# Patient Record
Sex: FEMALE | NOT HISPANIC OR LATINO | Employment: FULL TIME | URBAN - METROPOLITAN AREA
[De-identification: names, ages, dates, MRNs, and addresses within clinical notes are randomized per-mention and may not be internally consistent; named-entity substitution may affect disease eponyms.]

---

## 2023-12-06 ENCOUNTER — OFFICE VISIT (OUTPATIENT)
Dept: PRIMARY CARE | Facility: CLINIC | Age: 53
End: 2023-12-06
Payer: COMMERCIAL

## 2023-12-06 ENCOUNTER — LAB (OUTPATIENT)
Dept: LAB | Facility: LAB | Age: 53
End: 2023-12-06
Payer: COMMERCIAL

## 2023-12-06 VITALS
OXYGEN SATURATION: 96 % | WEIGHT: 138.4 LBS | DIASTOLIC BLOOD PRESSURE: 96 MMHG | HEART RATE: 82 BPM | SYSTOLIC BLOOD PRESSURE: 132 MMHG | BODY MASS INDEX: 24.52 KG/M2

## 2023-12-06 DIAGNOSIS — Z00.00 ENCOUNTER FOR PREVENTATIVE ADULT HEALTH CARE EXAMINATION: ICD-10-CM

## 2023-12-06 DIAGNOSIS — Z01.84 IMMUNITY STATUS TESTING: ICD-10-CM

## 2023-12-06 DIAGNOSIS — F32.0 CURRENT MILD EPISODE OF MAJOR DEPRESSIVE DISORDER WITHOUT PRIOR EPISODE (CMS-HCC): ICD-10-CM

## 2023-12-06 DIAGNOSIS — R03.0 ELEVATED BLOOD PRESSURE READING: ICD-10-CM

## 2023-12-06 DIAGNOSIS — Z23 NEED FOR DIPHTHERIA-TETANUS-PERTUSSIS (TDAP) VACCINE: ICD-10-CM

## 2023-12-06 DIAGNOSIS — Z12.31 SCREENING MAMMOGRAM FOR BREAST CANCER: Primary | ICD-10-CM

## 2023-12-06 LAB
25(OH)D3 SERPL-MCNC: 27 NG/ML (ref 30–100)
ALBUMIN SERPL BCP-MCNC: 4.5 G/DL (ref 3.4–5)
ALP SERPL-CCNC: 79 U/L (ref 33–110)
ALT SERPL W P-5'-P-CCNC: 16 U/L (ref 7–45)
ANION GAP SERPL CALC-SCNC: 14 MMOL/L (ref 10–20)
APPEARANCE UR: CLEAR
AST SERPL W P-5'-P-CCNC: 21 U/L (ref 9–39)
BASOPHILS # BLD AUTO: 0.06 X10*3/UL (ref 0–0.1)
BASOPHILS NFR BLD AUTO: 1 %
BILIRUB SERPL-MCNC: 1.3 MG/DL (ref 0–1.2)
BILIRUB UR STRIP.AUTO-MCNC: NEGATIVE MG/DL
BUN SERPL-MCNC: 20 MG/DL (ref 6–23)
CALCIUM SERPL-MCNC: 9.5 MG/DL (ref 8.6–10.6)
CHLORIDE SERPL-SCNC: 104 MMOL/L (ref 98–107)
CHOLEST SERPL-MCNC: 177 MG/DL (ref 0–199)
CHOLESTEROL/HDL RATIO: 2.1
CO2 SERPL-SCNC: 27 MMOL/L (ref 21–32)
COLOR UR: YELLOW
CREAT SERPL-MCNC: 0.67 MG/DL (ref 0.5–1.05)
EOSINOPHIL # BLD AUTO: 0.07 X10*3/UL (ref 0–0.7)
EOSINOPHIL NFR BLD AUTO: 1.2 %
ERYTHROCYTE [DISTWIDTH] IN BLOOD BY AUTOMATED COUNT: 12.2 % (ref 11.5–14.5)
EST. AVERAGE GLUCOSE BLD GHB EST-MCNC: 105 MG/DL
GFR SERPL CREATININE-BSD FRML MDRD: >90 ML/MIN/1.73M*2
GLUCOSE SERPL-MCNC: 87 MG/DL (ref 74–99)
GLUCOSE UR STRIP.AUTO-MCNC: NEGATIVE MG/DL
HBA1C MFR BLD: 5.3 %
HBV SURFACE AB SER-ACNC: <3.1 MIU/ML
HBV SURFACE AG SERPL QL IA: NONREACTIVE
HCT VFR BLD AUTO: 44.5 % (ref 36–46)
HDLC SERPL-MCNC: 86.1 MG/DL
HGB BLD-MCNC: 14.8 G/DL (ref 12–16)
IMM GRANULOCYTES # BLD AUTO: 0.02 X10*3/UL (ref 0–0.7)
IMM GRANULOCYTES NFR BLD AUTO: 0.3 % (ref 0–0.9)
KETONES UR STRIP.AUTO-MCNC: NEGATIVE MG/DL
LDLC SERPL CALC-MCNC: 75 MG/DL
LEUKOCYTE ESTERASE UR QL STRIP.AUTO: NEGATIVE
LYMPHOCYTES # BLD AUTO: 1.62 X10*3/UL (ref 1.2–4.8)
LYMPHOCYTES NFR BLD AUTO: 28 %
MCH RBC QN AUTO: 30.5 PG (ref 26–34)
MCHC RBC AUTO-ENTMCNC: 33.3 G/DL (ref 32–36)
MCV RBC AUTO: 92 FL (ref 80–100)
MONOCYTES # BLD AUTO: 0.38 X10*3/UL (ref 0.1–1)
MONOCYTES NFR BLD AUTO: 6.6 %
NEUTROPHILS # BLD AUTO: 3.64 X10*3/UL (ref 1.2–7.7)
NEUTROPHILS NFR BLD AUTO: 62.9 %
NITRITE UR QL STRIP.AUTO: NEGATIVE
NON HDL CHOLESTEROL: 91 MG/DL (ref 0–149)
NRBC BLD-RTO: 0 /100 WBCS (ref 0–0)
PH UR STRIP.AUTO: 6.5 [PH]
PLATELET # BLD AUTO: 281 X10*3/UL (ref 150–450)
POTASSIUM SERPL-SCNC: 4.2 MMOL/L (ref 3.5–5.3)
PROT SERPL-MCNC: 7.3 G/DL (ref 6.4–8.2)
PROT UR STRIP.AUTO-MCNC: NEGATIVE MG/DL
RBC # BLD AUTO: 4.86 X10*6/UL (ref 4–5.2)
RBC # UR STRIP.AUTO: NEGATIVE /UL
SODIUM SERPL-SCNC: 141 MMOL/L (ref 136–145)
SP GR UR STRIP.AUTO: >1.03
TRIGL SERPL-MCNC: 81 MG/DL (ref 0–149)
TSH SERPL-ACNC: 0.97 MIU/L (ref 0.44–3.98)
UROBILINOGEN UR STRIP.AUTO-MCNC: 0.2 MG/DL
VLDL: 16 MG/DL (ref 0–40)
WBC # BLD AUTO: 5.8 X10*3/UL (ref 4.4–11.3)

## 2023-12-06 PROCEDURE — 36415 COLL VENOUS BLD VENIPUNCTURE: CPT

## 2023-12-06 PROCEDURE — 99396 PREV VISIT EST AGE 40-64: CPT | Performed by: INTERNAL MEDICINE

## 2023-12-06 PROCEDURE — 93000 ELECTROCARDIOGRAM COMPLETE: CPT | Performed by: INTERNAL MEDICINE

## 2023-12-06 PROCEDURE — 86706 HEP B SURFACE ANTIBODY: CPT

## 2023-12-06 PROCEDURE — 83036 HEMOGLOBIN GLYCOSYLATED A1C: CPT

## 2023-12-06 PROCEDURE — 1036F TOBACCO NON-USER: CPT | Performed by: INTERNAL MEDICINE

## 2023-12-06 PROCEDURE — 82306 VITAMIN D 25 HYDROXY: CPT

## 2023-12-06 PROCEDURE — 80061 LIPID PANEL: CPT

## 2023-12-06 PROCEDURE — 87340 HEPATITIS B SURFACE AG IA: CPT

## 2023-12-06 PROCEDURE — 85025 COMPLETE CBC W/AUTO DIFF WBC: CPT

## 2023-12-06 PROCEDURE — 90471 IMMUNIZATION ADMIN: CPT | Performed by: INTERNAL MEDICINE

## 2023-12-06 PROCEDURE — 80053 COMPREHEN METABOLIC PANEL: CPT

## 2023-12-06 PROCEDURE — 81003 URINALYSIS AUTO W/O SCOPE: CPT | Performed by: INTERNAL MEDICINE

## 2023-12-06 PROCEDURE — 84443 ASSAY THYROID STIM HORMONE: CPT

## 2023-12-06 PROCEDURE — 90715 TDAP VACCINE 7 YRS/> IM: CPT | Performed by: INTERNAL MEDICINE

## 2023-12-06 RX ORDER — FLUOXETINE 10 MG/1
10 CAPSULE ORAL DAILY
Qty: 30 CAPSULE | Refills: 1 | Status: SHIPPED | OUTPATIENT
Start: 2023-12-06 | End: 2024-02-08 | Stop reason: SDUPTHER

## 2023-12-06 ASSESSMENT — PATIENT HEALTH QUESTIONNAIRE - PHQ9
4. FEELING TIRED OR HAVING LITTLE ENERGY: NOT AT ALL
1. LITTLE INTEREST OR PLEASURE IN DOING THINGS: SEVERAL DAYS
6. FEELING BAD ABOUT YOURSELF - OR THAT YOU ARE A FAILURE OR HAVE LET YOURSELF OR YOUR FAMILY DOWN: MORE THAN HALF THE DAYS
SUM OF ALL RESPONSES TO PHQ QUESTIONS 1-9: 6
10. IF YOU CHECKED OFF ANY PROBLEMS, HOW DIFFICULT HAVE THESE PROBLEMS MADE IT FOR YOU TO DO YOUR WORK, TAKE CARE OF THINGS AT HOME, OR GET ALONG WITH OTHER PEOPLE: SOMEWHAT DIFFICULT
5. POOR APPETITE OR OVEREATING: NOT AT ALL
SUM OF ALL RESPONSES TO PHQ9 QUESTIONS 1 & 2: 3
2. FEELING DOWN, DEPRESSED OR HOPELESS: MORE THAN HALF THE DAYS
3. TROUBLE FALLING OR STAYING ASLEEP: SEVERAL DAYS
7. TROUBLE CONCENTRATING ON THINGS, SUCH AS READING THE NEWSPAPER OR WATCHING TELEVISION: NOT AT ALL
9. THOUGHTS THAT YOU WOULD BE BETTER OFF DEAD, OR OF HURTING YOURSELF: NOT AT ALL
8. MOVING OR SPEAKING SO SLOWLY THAT OTHER PEOPLE COULD HAVE NOTICED. OR THE OPPOSITE, BEING SO FIGETY OR RESTLESS THAT YOU HAVE BEEN MOVING AROUND A LOT MORE THAN USUAL: NOT AT ALL

## 2023-12-06 ASSESSMENT — ANXIETY QUESTIONNAIRES
7. FEELING AFRAID AS IF SOMETHING AWFUL MIGHT HAPPEN: NOT AT ALL
4. TROUBLE RELAXING: SEVERAL DAYS
5. BEING SO RESTLESS THAT IT IS HARD TO SIT STILL: NOT AT ALL
IF YOU CHECKED OFF ANY PROBLEMS ON THIS QUESTIONNAIRE, HOW DIFFICULT HAVE THESE PROBLEMS MADE IT FOR YOU TO DO YOUR WORK, TAKE CARE OF THINGS AT HOME, OR GET ALONG WITH OTHER PEOPLE: SOMEWHAT DIFFICULT
1. FEELING NERVOUS, ANXIOUS, OR ON EDGE: SEVERAL DAYS
3. WORRYING TOO MUCH ABOUT DIFFERENT THINGS: NOT AT ALL
GAD7 TOTAL SCORE: 4
2. NOT BEING ABLE TO STOP OR CONTROL WORRYING: SEVERAL DAYS
6. BECOMING EASILY ANNOYED OR IRRITABLE: SEVERAL DAYS

## 2023-12-06 NOTE — PROGRESS NOTES
Subjective     Patient ID: Annmarie Hutchins is a 53 y.o. female who presents for No chief complaint on file..  HPI  53-year-old female patient of Dr. Murrell here for establishment of care and preventive care visit last seen 3/22.    Past medical history  -Menopause - h/o endometrial ablation in the past, LMP 10/20. Longstanding night sweats post birth largely unchanged, had anxiety treated with effexor but discontinued did not want to be treated with medications. Tried weighted blanket 0. Has been seeing a therapist   -LBP and SI joint pain -recommended conservative measures.     Social  - - for industrial automation   - Lives at home with Youngest 13, 15, 17, 21, 23 - oldest one out of the house.   -No tobacco alcohol or drug use    Lifestyle   - Diet - overall has healthy diet, organic, not always adherent.   - Exercise - lifts twice a week and yoga twice a week, some cardio component  - Sleep - wakes up in the middle of the night related to  stress, is able to go back to sleep, does do meditation.     Review of Systems:   General: No constitutional symptoms, some weight gain  HEENT: No headaches, changes in vision or hearing, no sinus or dental issues   Cardiac: No chest pain, palpitations, dyspnea on exertion   Lungs: No cough, wheezing, shortness of breath   Abdomen: No abdominal pain, nausea/vomiting, diarrhea or constipation   : No urinary complaints   Musculoskeletal: Back pain  Heme: No bleeding or thrombosis issues   Lymph: No swollen lymph glands   Skin: No rashes or lesions   Psych: As described    Objective       Current Outpatient Medications:     FLUoxetine (PROzac) 10 mg capsule, Take 1 capsule (10 mg) by mouth once daily., Disp: 30 capsule, Rfl: 1      BP (!) 132/96   Pulse 82   Wt 62.8 kg (138 lb 6.4 oz)   SpO2 96%   BMI 24.52 kg/m²       Physical Examination:   General: Awake, alert, appears stated age   Head/eyes/ears: NCAT, EOMI, PERRL, TM WNL, no cerumen  Throat: moist mucus  membranes, no pharyngeal erythema  Neck: Supple, nontender, no lymphadenopathy, thyroid exam unremarkable   Breast exam: No palpable lumps, no discharge, no noted axillary lymphadenopathy. Chaperone offered and declined  Heart: RRR, no murmurs, rubs or gallops  Lungs: CTA bilaterally, no rhonchi rales or wheezes   Abdomen: Soft, NT/ND  Extremities: No edema, 2+ DP pulses   Skin: No concerning skin lesions on visualized skin   Neuro: AAO x 3, no FND     Assessment/Plan   Problem List Items Addressed This Visit      Adult Health Examination  Age appropriate screening performed   Healthy lifestyle reviewed.   No additional pertinent family history or toxic habits   No high risk sexual behavior, declines STI screening  Cancer screening:   - Colonoscopy 3/22 repeat 10 years  - Mammogram 1/22 due for repeat  - Pap smear plus HPV 1/22  - Skin cancer prevention strategies reviewed, UTD with FSE   Immunizations: Flu shot declined, tetanus today, COVID booster at pharmacy, consider Shingrix  Dental and visual examinations up-to-date     Current mild episode of major depressive disorder without prior episode (CMS/HCC)     Discussed various ways of managing.  Currently engaging with therapist with some worsening of her symptoms.  Discussed consideration for management.   Previously treated with effexor, unclear benefit.   Will initiate trial of Prozac, counseling provided potential side effects and management strategies.  Will follow-up in 2 weeks if no response to symptoms at which point will start an SSRI.  If no symptom improvement may consider HRT.          Other Visit Diagnoses       Screening mammogram for breast cancer    -  Primary    Relevant Orders    BI mammo bilateral screening tomosynthesis    Need for diphtheria-tetanus-pertussis (Tdap) vaccine        Relevant Orders    Tdap vaccine, age 7 years and older    Immunity status testing        Relevant Orders    Hepatitis B Surface Antigen    Hepatitis B Surface  Antibody    Encounter for preventative adult health care examination        Relevant Orders    CBC and Auto Differential    Comprehensive Metabolic Panel    Hemoglobin A1C    Lipid Panel    TSH with reflex to Free T4 if abnormal    Vitamin D 25-Hydroxy,Total (for eval of Vitamin D levels)    Elevated blood pressure reading        Elevated in the past, attributed to anxiety and stress.  Advised home blood pressure monitoring further restratification and follow-up at next visit.    Relevant Orders    ECG 12 lead (Clinic Performed)        Low back pain -to discuss in greater detail at next visit, consider PT with DNS vs OMM     Follow-up 2 months

## 2023-12-06 NOTE — ASSESSMENT & PLAN NOTE
Discussed various ways of managing.  Currently engaging with therapist with some worsening of her symptoms.  Discussed consideration for management.   Previously treated with effexor, unclear benefit.   Will initiate trial of Prozac, counseling provided potential side effects and management strategies.  Will follow-up in 2 weeks if no response to symptoms at which point will start an SSRI.  If no symptom improvement may consider HRT.

## 2023-12-06 NOTE — PATIENT INSTRUCTIONS
VEGA Anguiano booster at pharmacy   Back pain - consider OMT vs physical therapist or physical therapist who performs dynamic neuromuscular stabilization.  Blood pressure   Go to www.validatebp.org for list of validated blood pressure cuffs.   I recommend you monitor your home blood pressure readings. To do this, be sure that the blood pressure cuff is on bare skin. Feet should be planted on the floor and back should be supported. Arm should be resting at the level of the heart. No talking to anyone during measurement and no caffeine within 30 minutes of checking blood pressure. Goal should be <130/80 on AVERAGE (outliers do not count).   I have ordered blood and/or urine tests for you to do today. The lab can be found on this floor (2nd floor) next to the pharmacy across from the elevators.    Follow up in 2 months.

## 2024-01-18 ENCOUNTER — APPOINTMENT (OUTPATIENT)
Dept: PRIMARY CARE | Facility: CLINIC | Age: 54
End: 2024-01-18
Payer: COMMERCIAL

## 2024-01-18 NOTE — PROGRESS NOTES
Subjective   Patient ID: Annmarie Hutchins is a 53 y.o. female who presents for No chief complaint on file..  HPI    53F here for followup visit, last seen in December for establishment of care and for preventive care visit.   - At the time was concerned about worsening depressive symptoms despite behavioral interventions, previously on effexor. Started prozac at last visit. Possible relation to perimenopause to consider HRT.  -Elevated blood pressure readings noted at last visit  -LBP-to discuss today.    12/23: possible LAE on EKG   Hep b not immune, vitamin d insufficient. Otherwise labs good.     Past medical history  -Menopause - h/o endometrial ablation in the past, LMP 10/20. Longstanding night sweats post birth largely unchanged, had anxiety treated with effexor but discontinued did not want to be treated with medications. Tried weighted blanket 0. Has been seeing a therapist   -LBP and SI joint pain -recommended conservative measures.      Social  - - for industrial automation   - Lives at home with Youngest 13, 15, 17, 21, 23 - oldest one out of the house.   -No tobacco alcohol or drug use       Current Outpatient Medications   Medication Instructions    FLUoxetine (PROZAC) 10 mg, oral, Daily        Objective     There were no vitals taken for this visit.    Physical Exam    Assessment/Plan   Problem List Items Addressed This Visit    None    Health maintenance  Cancer screening:  -Colonoscopy 3/22 repeat 10 years   -Mammogram last visit  -Pap smear plus HPV 1/22   -Skin- utd with FSE   Immunizations:

## 2024-02-08 ENCOUNTER — OFFICE VISIT (OUTPATIENT)
Dept: PRIMARY CARE | Facility: CLINIC | Age: 54
End: 2024-02-08
Payer: COMMERCIAL

## 2024-02-08 ENCOUNTER — DOCUMENTATION (OUTPATIENT)
Dept: PRIMARY CARE | Facility: CLINIC | Age: 54
End: 2024-02-08

## 2024-02-08 VITALS
WEIGHT: 138 LBS | HEART RATE: 86 BPM | DIASTOLIC BLOOD PRESSURE: 83 MMHG | OXYGEN SATURATION: 95 % | HEIGHT: 63 IN | TEMPERATURE: 97.4 F | BODY MASS INDEX: 24.45 KG/M2 | SYSTOLIC BLOOD PRESSURE: 128 MMHG

## 2024-02-08 DIAGNOSIS — M54.50 CHRONIC BILATERAL LOW BACK PAIN WITHOUT SCIATICA: Primary | ICD-10-CM

## 2024-02-08 DIAGNOSIS — R03.0 ELEVATED BLOOD PRESSURE READING: ICD-10-CM

## 2024-02-08 DIAGNOSIS — G89.29 CHRONIC BILATERAL LOW BACK PAIN WITHOUT SCIATICA: Primary | ICD-10-CM

## 2024-02-08 DIAGNOSIS — F32.0 CURRENT MILD EPISODE OF MAJOR DEPRESSIVE DISORDER WITHOUT PRIOR EPISODE (CMS-HCC): ICD-10-CM

## 2024-02-08 DIAGNOSIS — R68.82 LOW LIBIDO: ICD-10-CM

## 2024-02-08 PROCEDURE — 1036F TOBACCO NON-USER: CPT | Performed by: INTERNAL MEDICINE

## 2024-02-08 PROCEDURE — 99213 OFFICE O/P EST LOW 20 MIN: CPT | Performed by: INTERNAL MEDICINE

## 2024-02-08 RX ORDER — FLUOXETINE 10 MG/1
10 CAPSULE ORAL DAILY
Qty: 90 CAPSULE | Refills: 3 | Status: SHIPPED | OUTPATIENT
Start: 2024-02-08 | End: 2025-02-02

## 2024-02-08 ASSESSMENT — PATIENT HEALTH QUESTIONNAIRE - PHQ9
10. IF YOU CHECKED OFF ANY PROBLEMS, HOW DIFFICULT HAVE THESE PROBLEMS MADE IT FOR YOU TO DO YOUR WORK, TAKE CARE OF THINGS AT HOME, OR GET ALONG WITH OTHER PEOPLE: NOT DIFFICULT AT ALL
3. TROUBLE FALLING OR STAYING ASLEEP: SEVERAL DAYS
6. FEELING BAD ABOUT YOURSELF - OR THAT YOU ARE A FAILURE OR HAVE LET YOURSELF OR YOUR FAMILY DOWN: NOT AT ALL
4. FEELING TIRED OR HAVING LITTLE ENERGY: NOT AT ALL
8. MOVING OR SPEAKING SO SLOWLY THAT OTHER PEOPLE COULD HAVE NOTICED. OR THE OPPOSITE, BEING SO FIGETY OR RESTLESS THAT YOU HAVE BEEN MOVING AROUND A LOT MORE THAN USUAL: NOT AT ALL
SUM OF ALL RESPONSES TO PHQ QUESTIONS 1-9: 2
5. POOR APPETITE OR OVEREATING: NOT AT ALL
2. FEELING DOWN, DEPRESSED OR HOPELESS: SEVERAL DAYS
9. THOUGHTS THAT YOU WOULD BE BETTER OFF DEAD, OR OF HURTING YOURSELF: NOT AT ALL
SUM OF ALL RESPONSES TO PHQ9 QUESTIONS 1 & 2: 1
7. TROUBLE CONCENTRATING ON THINGS, SUCH AS READING THE NEWSPAPER OR WATCHING TELEVISION: NOT AT ALL
1. LITTLE INTEREST OR PLEASURE IN DOING THINGS: NOT AT ALL

## 2024-02-08 ASSESSMENT — PAIN SCALES - GENERAL: PAINLEVEL: 0-NO PAIN

## 2024-02-08 NOTE — ASSESSMENT & PLAN NOTE
PHQ from 6 to 2!! Goal treatment for 6-12 months after which we will hope to discontinue   Continue following with therapist

## 2024-02-08 NOTE — PROGRESS NOTES
"Subjective   Patient ID: Annmarie Hutchins is a 53 y.o. female who presents for Follow-up.  HPI  53-year-old female here for follow-up visit, last seen in December for establishment of care and preventive care visit.  12/23: Labs good, mild vitamin D insufficiency, hep B nonimmune    - Depression-previously treated with Effexor, initiate trial of Prozac at last visit.   - At last visit, blood pressure readings were noted to be elevated recommended home blood pressure monitoring  - 2 years of low back pain and SI joint, some in cervical spine, seen by chiropractor in the past, no sciatica, no weakness, numbness/tingling. Symptoms most in the early in the day and also at the end.         Past medical history  -Menopause - h/o endometrial ablation in the past, LMP 10/20. Longstanding night sweats post pregancnies largely unchanged, had anxiety treated with effexor but discontinued did not want to be treated with medications. Tried weighted blanket 0. Has been seeing a therapist   -LBP and SI joint pain -recommended conservative measures.      Social  - - for industrial automation   - Lives at home with Youngest 13, 15, 17, 21, 23 - oldest one out of the house.   -No tobacco alcohol or drug use  Current Outpatient Medications   Medication Instructions    FLUoxetine (PROZAC) 10 mg, oral, Daily        Objective     /83   Pulse 86   Temp 36.3 °C (97.4 °F) (Temporal)   Ht 1.6 m (5' 3\")   Wt 62.6 kg (138 lb)   SpO2 95%   BMI 24.45 kg/m²     Physical Exam  General: Alert and oriented, in no apparent distress   HEENT: No conjunctival erythema, no external facial lesions   Lungs: Breathing comfortably  Skin: No evidence of skin breakdown.  Neuro: AAO x 3, answering questions appropriately, no obvious cranial nerve deficits   Musk: no paraspinal tenderness, no midline tenderness, strength 5/5 bilateral lower extremities, no sensory deficits. SLR test negative bilaterally, no midline spinous tenderness to " palpation.   Assessment/Plan   Problem List Items Addressed This Visit       Current mild episode of major depressive disorder without prior episode (CMS/HCC)     PHQ from 6 to 2!! Goal treatment for 6-12 months after which we will hope to discontinue   Continue following with therapist          Relevant Medications    FLUoxetine (PROzac) 10 mg capsule    Chronic bilateral low back pain without sciatica - Primary  Reviewed supportive measures, pain control, refer to physical therapy     Relevant Orders    Referral to Physical Therapy     Other Visit Diagnoses       Low libido      Longstanding, do not suspect related to perimenopause, will refer to sexual medicine.    Relevant Orders    Referral to Sexual Medicine    Elevated blood pressure reading      Mild, recheck at next visit. Continue healthy lifestyle.     Relevant Orders    Follow Up In [facility] - PCP - Established            Health maintenance  Cancer screening:  -Colonoscopy 3/22 repeat 10 years  -Mammogram ordered not yet obtained  -Pap smear plus HPV 1/22  -Follows with Derm for FSE  Immunizations: Hepatitis B nonimmune declines vaccination

## 2024-02-08 NOTE — PATIENT INSTRUCTIONS
Annmarie,   Blood pressure- continue to work on healthy lifestyle, we will recheck blood pressure in 6 months   Referral to sexual medicine - I recommend Dr. Pamela Humphrey   Back pain - referral to physical therapy   See you in 6 months

## 2024-02-08 NOTE — PROGRESS NOTES
Pt has an established PCP already .   She asked about billing today and I informed her that she will be a new pt for this practice . If her concerns are urgent/ emergent , she can discuss today . If non urgent , she can fu with her PCP .   Pt chose to cancel her appt .

## 2024-02-15 ENCOUNTER — APPOINTMENT (OUTPATIENT)
Dept: UROLOGY | Facility: CLINIC | Age: 54
End: 2024-02-15
Payer: COMMERCIAL

## 2024-05-10 ENCOUNTER — HOSPITAL ENCOUNTER (OUTPATIENT)
Dept: RADIOLOGY | Facility: CLINIC | Age: 54
Discharge: HOME | End: 2024-05-10
Payer: COMMERCIAL

## 2024-05-10 VITALS — BODY MASS INDEX: 24.45 KG/M2 | WEIGHT: 138.01 LBS | HEIGHT: 63 IN

## 2024-05-10 PROCEDURE — 77067 SCR MAMMO BI INCL CAD: CPT

## 2024-05-10 PROCEDURE — 77063 BREAST TOMOSYNTHESIS BI: CPT | Performed by: STUDENT IN AN ORGANIZED HEALTH CARE EDUCATION/TRAINING PROGRAM

## 2024-05-10 PROCEDURE — 77067 SCR MAMMO BI INCL CAD: CPT | Performed by: STUDENT IN AN ORGANIZED HEALTH CARE EDUCATION/TRAINING PROGRAM

## 2024-05-20 DIAGNOSIS — R92.8 ABNORMAL MAMMOGRAM OF RIGHT BREAST: Primary | ICD-10-CM

## 2024-05-31 ENCOUNTER — HOSPITAL ENCOUNTER (OUTPATIENT)
Dept: RADIOLOGY | Facility: CLINIC | Age: 54
Discharge: HOME | End: 2024-05-31
Payer: COMMERCIAL

## 2024-05-31 VITALS — WEIGHT: 138.01 LBS | HEIGHT: 63 IN | BODY MASS INDEX: 24.45 KG/M2

## 2024-05-31 DIAGNOSIS — R92.8 ABNORMAL MAMMOGRAM OF RIGHT BREAST: ICD-10-CM

## 2024-05-31 PROCEDURE — 77061 BREAST TOMOSYNTHESIS UNI: CPT | Mod: RT

## 2024-05-31 PROCEDURE — 77061 BREAST TOMOSYNTHESIS UNI: CPT | Mod: RIGHT SIDE | Performed by: STUDENT IN AN ORGANIZED HEALTH CARE EDUCATION/TRAINING PROGRAM

## 2024-05-31 PROCEDURE — 77065 DX MAMMO INCL CAD UNI: CPT | Mod: RIGHT SIDE | Performed by: STUDENT IN AN ORGANIZED HEALTH CARE EDUCATION/TRAINING PROGRAM

## 2024-08-01 ENCOUNTER — APPOINTMENT (OUTPATIENT)
Dept: UROLOGY | Facility: CLINIC | Age: 54
End: 2024-08-01
Payer: COMMERCIAL

## 2024-09-12 ENCOUNTER — APPOINTMENT (OUTPATIENT)
Dept: UROLOGY | Facility: CLINIC | Age: 54
End: 2024-09-12
Payer: COMMERCIAL

## 2024-09-12 VITALS
DIASTOLIC BLOOD PRESSURE: 88 MMHG | HEART RATE: 69 BPM | WEIGHT: 145 LBS | SYSTOLIC BLOOD PRESSURE: 132 MMHG | HEIGHT: 63 IN | BODY MASS INDEX: 25.69 KG/M2 | TEMPERATURE: 97.3 F

## 2024-09-12 DIAGNOSIS — N95.1 MENOPAUSAL SYMPTOMS: ICD-10-CM

## 2024-09-12 PROCEDURE — 3008F BODY MASS INDEX DOCD: CPT | Performed by: OBSTETRICS & GYNECOLOGY

## 2024-09-12 PROCEDURE — 99204 OFFICE O/P NEW MOD 45 MIN: CPT | Performed by: OBSTETRICS & GYNECOLOGY

## 2024-09-12 ASSESSMENT — PAIN SCALES - GENERAL: PAINLEVEL: 0-NO PAIN

## 2024-09-12 NOTE — PROGRESS NOTES
INITIAL EVALUATION       HISTORY OF PRESENT ILLNESS:   Annmarie Hutchins is a 54 y.o. female who presents to me today for evaluation of menopause. Her last menses was in 2020. She reports experiencing nocturia x 1, low desire, and sweatiness. She reports that approximately 1 year before being diagnosed with depression she went through an angry, anxious phase where she was extremely irritable. She currently takes Prozac 10 mg for depression which works well for her.    She has a history of several miscarriages, 1 ectopic pregnancy, and 1 still birth.     She was diagnosed with osteopenia on 1/14/22.     PAST MEDICAL HISTORY:  Past Medical History:   Diagnosis Date    Asymptomatic menopausal state 03/29/2022    History of menopause    Personal history of other diseases of the musculoskeletal system and connective tissue 01/14/2022    History of osteopenia    Reaction to severe stress, unspecified 01/24/2022    Stress       PAST SURGICAL HISTORY:  Past Surgical History:   Procedure Laterality Date    ENDOMETRIAL ABLATION      SALPINGECTOMY          ALLERGIES:   No Known Allergies     MEDICATIONS:   Medication Documentation Review Audit       Reviewed by Lesa Finn MA (Medical Assistant) on 09/12/24 at 1314      Medication Order Taking? Sig Documenting Provider Last Dose Status   FLUoxetine (PROzac) 10 mg capsule [id number] Yes Take 1 capsule (10 mg) by mouth once daily. Eva Edmondson, DO Taking Active                     SOCIAL HISTORY:  Patient  reports that she has never smoked. She has never used smokeless tobacco. She reports current alcohol use of about 1.0 standard drink of alcohol per week. She reports that she does not use drugs.   Social History     Socioeconomic History    Marital status:      Spouse name: Not on file    Number of children: Not on file    Years of education: Not on file    Highest education level: Not on file   Occupational History    Not on file   Tobacco Use    Smoking status:  "Never    Smokeless tobacco: Never   Substance and Sexual Activity    Alcohol use: Yes     Alcohol/week: 1.0 standard drink of alcohol     Types: 1 Standard drinks or equivalent per week    Drug use: Never    Sexual activity: Not on file   Other Topics Concern    Not on file   Social History Narrative    Not on file     Social Determinants of Health     Financial Resource Strain: Not on File (10/8/2020)    [facility] from [facility]    Financial Resource Strain     Financial Resource Strain: 0   Food Insecurity: Not on File (10/8/2020)    [facility] from [facility]    Food Insecurity     Food: 0   Transportation Needs: Not on File (10/8/2020)    [facility] from [facility]    Transportation Needs     Transportation: 0   Physical Activity: Not on File (10/8/2020)    [facility] from [facility]    Physical Activity     Physical Activity: 0   Stress: Not on File (10/8/2020)    [facility] from [facility]    Stress     Stress: 0   Social Connections: Not on File (10/8/2020)    Received from [facility]    Social Connections     Social Connections and Isolation: 0   Intimate Partner Violence: Not on file   Housing Stability: Not on File (10/8/2020)    [facility] from [facility]    Housing Stability     Housin       FAMILY HISTORY:  No family history on file.    REVIEW OF SYSTEMS:  Constitutional: Negative for fever and chills. Denies anorexia, weight loss.  Eyes: Negative for visual disturbance.   Respiratory: Negative for shortness of breath.    Cardiovascular: Negative for chest pain.   Gastrointestinal: Negative for nausea and vomiting.   Genitourinary: See interval history above.  Skin: Negative for rash.   Neurological: Negative for dizziness and numbness.   Psychiatric/Behavioral: Negative for confusion and decreased concentration.     PHYSICAL EXAM:  Blood pressure 132/88, pulse 69, temperature 36.3 °C (97.3 °F), height 1.6 m (5' 3\"), weight 65.8 kg (145 lb).  Constitutional: Patient appears well-developed and " well-nourished. No distress.    Head: Normocephalic and atraumatic.    Neck: Normal range of motion.    Cardiovascular: Normal rate.    Pulmonary/Chest: Effort normal. No respiratory distress.   Musculoskeletal: Normal range of motion.    Neurological: Alert and oriented to person, place, and time.  Psychiatric: Normal mood and affect. Behavior is normal. Thought content normal.       Assessment:        Annmarie Hutchins is a 54 y.o. female with menopausal symptoms.    Current research considers the initiation of menopausal hormone therapy (MHT) to be a safe option for healthy, symptomatic women who are within 10 years of menopause or younger than age 60 years and who do not have contraindications to MHT (such as a history of breast cancer, coronary heart disease, a previous venous thromboembolic event or stroke, or active liver disease).     Benefits include improvement in mood, sleep, and hot flashes. We will start at the lowest dose of a weekly estrogen patch and oral micronized progesterone tablet to protect the lining of the uterus from the estrogen.       We will re-evaluate in 3 months to see if the dose is the right amount of estrogen. The risk of breast cancer is individualized, but we will try to avoid continuing estrogen beyond 5 years for this reason unless menopausal symptoms continue to disrupt your quality of life. At that time, we can make a shared and informed decision about risks/benefits.      Plan:   Prescription for estradiol 0.025 mg patch sent to the patient's pharmacy.    Follow-up in 3 months.       Pamela Humphrey MD MPH      Scribe Attestation  By signing my name below, I Adriana Patel, Scribe   attest that this documentation has been prepared under the direction and in the presence of Pamela Humphrey MD MPH.

## 2024-09-16 RX ORDER — ESTRADIOL 0.05 MG/D
1 FILM, EXTENDED RELEASE TRANSDERMAL 2 TIMES WEEKLY
Qty: 24 PATCH | Refills: 1 | Status: SHIPPED | OUTPATIENT
Start: 2024-09-16 | End: 2025-03-03

## 2024-09-16 RX ORDER — PROGESTERONE 100 MG/1
100 CAPSULE ORAL NIGHTLY
Qty: 60 CAPSULE | Refills: 3 | Status: SHIPPED | OUTPATIENT
Start: 2024-09-16 | End: 2025-09-16

## 2024-10-18 ENCOUNTER — APPOINTMENT (OUTPATIENT)
Dept: BEHAVIORAL HEALTH | Facility: CLINIC | Age: 54
End: 2024-10-18
Payer: COMMERCIAL

## 2024-10-18 DIAGNOSIS — F32.0 CURRENT MILD EPISODE OF MAJOR DEPRESSIVE DISORDER, UNSPECIFIED WHETHER RECURRENT (CMS-HCC): Primary | ICD-10-CM

## 2024-10-18 PROCEDURE — 90791 PSYCH DIAGNOSTIC EVALUATION: CPT | Performed by: PSYCHOLOGIST

## 2024-10-18 NOTE — LETTER
October 18, 2024     Pamela Humphrey MD MPH  [address]  [place] [address]    Patient: Annmarie Hutchins   YOB: 1970   Date of Visit: 10/18/2024       Dear Dr. Pamela Humphrey MD MPH:    Thank you for referring Annmarie Hutchins to me for evaluation. Below are my notes for this consultation.  If you have questions, please do not hesitate to call me. I look forward to following your patient along with you.       Sincerely,     Jennifer Colindres, PhD      CC: No Recipients  ______________________________________________________________________________________    Initial Assessment  Virtual visit with simultaneous audio and visual equipment.  POS 10  No falls, tobacco use, or SI    Annmarie 54, is referred for CBT and to address HSDD by Dr. Humphrey.  Relevant History  Annmarie is  to [place] x 30 years. She had a stillbirth and ectopic pregnancy before having her 5 living children ages 23, 21, 18, 16, 14.  The 16 and 14 year old both have sig psychological issues (depression) and the 18 year old --now at college, has an eating disorder. 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  She is very happy with Napoleon but reports HSDD x many years.  This is one chief complaint for her.  She has had sig menopausal symptoms including VMS, insomnia and Dr. Humphrey started her on estradiol patch and Prometrium and 5 weeks in she feels sig improvement.  Blood pressure has been higher than typical for 2 years and is being watched by PCP.  She meets criteria for mild to moderate depression and has been on prozac since January.  She was in therapy for 2 years and found it helpful but stopped due to insurance issues. She would like to work with me for now to focus on her HSDD.  Plan-CBT to help reduce a variety of postmenopausal symptoms and focus on HSDD.  Initially provided some education re desire and  responsive desire and, particularly since she enjoys meditation and yoga, asked her (and Napoleon) to listen to audiobook of Better sex thru mindfulness. Will then see her back to continue assessment and treatment of HSDD and will consider other pharmacologic options if symptoms persist once stable on HT.

## 2024-10-18 NOTE — PROGRESS NOTES
Initial Assessment  Virtual visit with simultaneous audio and visual equipment.  POS 10  No falls, tobacco use, or SI    Annmarie 54, is referred for CBT and to address HSDD by Dr. Humphrey.  Relevant History  Annmarie is  to [place] x 30 years. She had a stillbirth and ectopic pregnancy before having her 5 living children ages 23, 21, 18, 16, 14.  The 16 and 14 year old both have sig psychological issues (depression) and the 18 year old --now at college, has an eating disorder. 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  She is very happy with Napoleon but reports HSDD x many years.  This is one chief complaint for her.  She has had sig menopausal symptoms including VMS, insomnia and Dr. Humphrey started her on estradiol patch and Prometrium and 5 weeks in she feels sig improvement.  Blood pressure has been higher than typical for 2 years and is being watched by PCP.  She meets criteria for mild to moderate depression and has been on prozac since January.  She was in therapy for 2 years and found it helpful but stopped due to insurance issues. She would like to work with me for now to focus on her HSDD.  Plan-CBT to help reduce a variety of postmenopausal symptoms and focus on HSDD.  Initially provided some education re desire and responsive desire and, particularly since she enjoys meditation and yoga, asked her (and Napoleon) to listen to audiobook of Better sex thru mindfulness. Will then see her back to continue assessment and treatment of HSDD and will consider other pharmacologic options if symptoms persist once stable on HT.

## 2024-10-31 ENCOUNTER — TELEMEDICINE (OUTPATIENT)
Dept: BEHAVIORAL HEALTH | Facility: CLINIC | Age: 54
End: 2024-10-31
Payer: COMMERCIAL

## 2024-10-31 DIAGNOSIS — F32.0 CURRENT MILD EPISODE OF MAJOR DEPRESSIVE DISORDER WITHOUT PRIOR EPISODE (CMS-HCC): Primary | ICD-10-CM

## 2024-10-31 PROCEDURE — 90837 PSYTX W PT 60 MINUTES: CPT | Performed by: PSYCHOLOGIST

## 2024-11-27 NOTE — PROGRESS NOTES
FOLLOW-UP VISIT       HISTORY OF PRESENT ILLNESS:   Annmarie Hutchins is a 54 y.o. female who presents to me today for follow-up of menopausal symptoms. At her last visit, she was started on estradiol 0.05 mg patches and micronized progesterone 100 mg nightly as part of MHT which the patient states has worked well for her. She reports that her sweatiness initially went away however recently started to experience this again. She is interested in trying a higher dose of estrogen.     The patient sees Dr. Colindres for HSDD which she states is working well for her. She is possibly interested in testosterone therapy for further management of HSDD.     The patient has concerns for weight gain which started earlier this year. She currently participates in yoga for physical activity.     PAST MEDICAL HISTORY:  Past Medical History:   Diagnosis Date    Asymptomatic menopausal state 03/29/2022    History of menopause    Personal history of other diseases of the musculoskeletal system and connective tissue 01/14/2022    History of osteopenia    Reaction to severe stress, unspecified 01/24/2022    Stress       PAST SURGICAL HISTORY:  Past Surgical History:   Procedure Laterality Date    ENDOMETRIAL ABLATION      SALPINGECTOMY          ALLERGIES:   No Known Allergies     MEDICATIONS:   Medication Documentation Review Audit       Reviewed by Lesa Finn MA (Medical Assistant) on 12/05/24 at 0921      Medication Order Taking? Sig Documenting Provider Last Dose Status   estradiol (Vivelle-Dot) 0.05 mg/24 hr patch [id number] Yes Place 1 patch over 96 hours on the skin 2 times a week. Pamela Humphrey MD MPH  Active   FLUoxetine (PROzac) 10 mg capsule [id number] Yes Take 1 capsule (10 mg) by mouth once daily. Eva Emdondson,  Taking Active   progesterone (Prometrium) 100 mg capsule [id number] Yes Take 1 capsule (100 mg) by mouth once daily at bedtime. Pamela Humphrey MD MPH  Active                     SOCIAL HISTORY:  Patient  reports that  she has never smoked. She has never used smokeless tobacco. She reports current alcohol use of about 1.0 standard drink of alcohol per week. She reports that she does not use drugs.   Social History     Socioeconomic History    Marital status:      Spouse name: Not on file    Number of children: Not on file    Years of education: Not on file    Highest education level: Not on file   Occupational History    Not on file   Tobacco Use    Smoking status: Never    Smokeless tobacco: Never   Substance and Sexual Activity    Alcohol use: Yes     Alcohol/week: 1.0 standard drink of alcohol     Types: 1 Standard drinks or equivalent per week    Drug use: Never    Sexual activity: Not on file   Other Topics Concern    Not on file   Social History Narrative    Not on file     Social Drivers of Health     Financial Resource Strain: Not on File (10/8/2020)    [facility] from [facility]    Financial Resource Strain     Financial Resource Strain: 0   Food Insecurity: Not on File (10/8/2020)    Received from [facility]    Food Insecurity     Food: 0   Transportation Needs: Not on File (10/8/2020)    Received from [facility]    Transportation Needs     Transportation: 0   Physical Activity: Not on File (10/8/2020)    [facility] from [facility]    Physical Activity     Physical Activity: 0   Stress: Not on File (10/8/2020)    Received from [facility]    Stress     Stress: 0   Social Connections: Not on File (10/8/2020)    Received from [facility]    Social Connections     Social Connections and Isolation: 0   Intimate Partner Violence: Not on file   Housing Stability: Not on File (10/8/2020)    Received from [facility]    Housing Stability     Housin       FAMILY HISTORY:  No family history on file.    REVIEW OF SYSTEMS:  Constitutional: Negative for fever and chills. Denies anorexia, weight loss.  Eyes: Negative for visual disturbance.   Respiratory: Negative for shortness of breath.    Cardiovascular: Negative  "for chest pain.   Gastrointestinal: Negative for nausea and vomiting.   Genitourinary: See interval history above.  Skin: Negative for rash.   Neurological: Negative for dizziness and numbness.   Psychiatric/Behavioral: Negative for confusion and decreased concentration.     PHYSICAL EXAM:  Blood pressure 126/81, pulse 72, temperature 36.4 °C (97.6 °F), height 1.6 m (5' 3\"), weight 66.7 kg (147 lb).  Constitutional: Patient appears well-developed and well-nourished. No distress.    Head: Normocephalic and atraumatic.    Neck: Normal range of motion.    Cardiovascular: Normal rate.    Pulmonary/Chest: Effort normal. No respiratory distress.   Musculoskeletal: Normal range of motion.    Neurological: Alert and oriented to person, place, and time.  Psychiatric: Normal mood and affect. Behavior is normal. Thought content normal.       Assessment:      1. Menopausal symptoms            Annmarie Hutchins is a 54 y.o. female with menopausal symptoms and HSDD.     We discussed r/b/a's to testosterone for HSDD and she would like to try it.      Plan:   Prescription for estradiol 0.075 mg patch sent to the patient's pharmacy.    Prescription for progesterone 100 mg capsules sent to the patient's pharmacy.   Prescription for testosterone gel sent to the patient's pharmacy.   Order baseline testosterone labs as well as testosterone levels after 6-8 weeks starting therapy.   Follow-up in 3 months.   Follow-up with Dr. Colindres in 1-2 months.       Pamela Humphrey MD MPH      Scribe Attestation  By signing my name below, IAdriana, Scribe   attest that this documentation has been prepared under the direction and in the presence of Pmaela Humphrey MD MPH.    "

## 2024-12-05 ENCOUNTER — LAB (OUTPATIENT)
Dept: LAB | Facility: LAB | Age: 54
End: 2024-12-05
Payer: COMMERCIAL

## 2024-12-05 ENCOUNTER — APPOINTMENT (OUTPATIENT)
Dept: UROLOGY | Facility: CLINIC | Age: 54
End: 2024-12-05
Payer: COMMERCIAL

## 2024-12-05 VITALS
BODY MASS INDEX: 26.05 KG/M2 | HEART RATE: 72 BPM | DIASTOLIC BLOOD PRESSURE: 81 MMHG | WEIGHT: 147 LBS | TEMPERATURE: 97.6 F | SYSTOLIC BLOOD PRESSURE: 126 MMHG | HEIGHT: 63 IN

## 2024-12-05 DIAGNOSIS — N95.1 MENOPAUSAL SYMPTOMS: ICD-10-CM

## 2024-12-05 DIAGNOSIS — F52.0 HYPOACTIVE SEXUAL DESIRE DISORDER: ICD-10-CM

## 2024-12-05 LAB — TESTOST SERPL-MCNC: <30 NG/DL (ref 0–70)

## 2024-12-05 PROCEDURE — 99213 OFFICE O/P EST LOW 20 MIN: CPT | Performed by: OBSTETRICS & GYNECOLOGY

## 2024-12-05 PROCEDURE — 1036F TOBACCO NON-USER: CPT | Performed by: OBSTETRICS & GYNECOLOGY

## 2024-12-05 PROCEDURE — 3008F BODY MASS INDEX DOCD: CPT | Performed by: OBSTETRICS & GYNECOLOGY

## 2024-12-05 PROCEDURE — 84403 ASSAY OF TOTAL TESTOSTERONE: CPT

## 2024-12-05 RX ORDER — ESTRADIOL 0.07 MG/D
1 FILM, EXTENDED RELEASE TRANSDERMAL 2 TIMES WEEKLY
Qty: 26 PATCH | Refills: 3 | Status: SHIPPED | OUTPATIENT
Start: 2024-12-05 | End: 2024-12-05 | Stop reason: SDUPTHER

## 2024-12-05 RX ORDER — TESTOSTERONE 20.25 MG/1.25G
GEL TOPICAL
Qty: 75 G | Refills: 3 | Status: SHIPPED | OUTPATIENT
Start: 2024-12-05 | End: 2024-12-05 | Stop reason: SDUPTHER

## 2024-12-05 ASSESSMENT — PAIN SCALES - GENERAL: PAINLEVEL_OUTOF10: 0-NO PAIN

## 2024-12-06 DIAGNOSIS — F52.0 HYPOACTIVE SEXUAL DESIRE DISORDER: ICD-10-CM

## 2024-12-06 DIAGNOSIS — N95.1 MENOPAUSAL SYMPTOMS: ICD-10-CM

## 2024-12-06 RX ORDER — TESTOSTERONE 20.25 MG/1.25G
GEL TOPICAL
Qty: 75 G | Refills: 3 | Status: SHIPPED | OUTPATIENT
Start: 2024-12-06

## 2024-12-06 RX ORDER — PROGESTERONE 100 MG/1
100 CAPSULE ORAL NIGHTLY
Qty: 60 CAPSULE | Refills: 3 | Status: SHIPPED | OUTPATIENT
Start: 2024-12-06 | End: 2024-12-10

## 2024-12-06 RX ORDER — TESTOSTERONE 20.25 MG/1.25G
GEL TOPICAL
Qty: 75 G | Refills: 3 | Status: SHIPPED | OUTPATIENT
Start: 2024-12-06 | End: 2024-12-06 | Stop reason: SDUPTHER

## 2024-12-06 RX ORDER — ESTRADIOL 0.07 MG/D
1 FILM, EXTENDED RELEASE TRANSDERMAL 2 TIMES WEEKLY
Qty: 26 PATCH | Refills: 3 | Status: SHIPPED | OUTPATIENT
Start: 2024-12-09 | End: 2024-12-10

## 2024-12-10 RX ORDER — PROGESTERONE 100 MG/1
100 CAPSULE ORAL NIGHTLY
Qty: 90 CAPSULE | Refills: 1 | Status: SHIPPED | OUTPATIENT
Start: 2024-12-10

## 2024-12-10 RX ORDER — ESTRADIOL 0.07 MG/D
1 FILM, EXTENDED RELEASE TRANSDERMAL 2 TIMES WEEKLY
Qty: 26 PATCH | Refills: 1 | Status: SHIPPED | OUTPATIENT
Start: 2024-12-12

## 2025-01-28 DIAGNOSIS — N95.1 MENOPAUSAL SYMPTOMS: ICD-10-CM

## 2025-01-28 RX ORDER — ESTRADIOL 0.1 MG/D
1 FILM, EXTENDED RELEASE TRANSDERMAL 2 TIMES WEEKLY
Qty: 24 PATCH | Refills: 1 | Status: SHIPPED | OUTPATIENT
Start: 2025-01-30 | End: 2026-01-30

## 2025-02-19 ENCOUNTER — APPOINTMENT (OUTPATIENT)
Dept: PRIMARY CARE | Facility: CLINIC | Age: 55
End: 2025-02-19
Payer: COMMERCIAL

## 2025-02-20 ENCOUNTER — APPOINTMENT (OUTPATIENT)
Dept: PRIMARY CARE | Facility: CLINIC | Age: 55
End: 2025-02-20
Payer: COMMERCIAL

## 2025-02-20 VITALS
HEART RATE: 62 BPM | WEIGHT: 146 LBS | DIASTOLIC BLOOD PRESSURE: 71 MMHG | OXYGEN SATURATION: 98 % | TEMPERATURE: 98 F | BODY MASS INDEX: 25.86 KG/M2 | SYSTOLIC BLOOD PRESSURE: 124 MMHG

## 2025-02-20 DIAGNOSIS — F32.0 CURRENT MILD EPISODE OF MAJOR DEPRESSIVE DISORDER WITHOUT PRIOR EPISODE (CMS-HCC): ICD-10-CM

## 2025-02-20 DIAGNOSIS — N95.1 MENOPAUSAL SYMPTOMS: ICD-10-CM

## 2025-02-20 DIAGNOSIS — Z00.00 ENCOUNTER FOR PREVENTATIVE ADULT HEALTH CARE EXAMINATION: ICD-10-CM

## 2025-02-20 DIAGNOSIS — Z12.31 SCREENING MAMMOGRAM FOR BREAST CANCER: Primary | ICD-10-CM

## 2025-02-20 PROCEDURE — 1036F TOBACCO NON-USER: CPT | Performed by: INTERNAL MEDICINE

## 2025-02-20 PROCEDURE — 99396 PREV VISIT EST AGE 40-64: CPT | Performed by: INTERNAL MEDICINE

## 2025-02-20 RX ORDER — ESTRADIOL 0.1 MG/D
1 FILM, EXTENDED RELEASE TRANSDERMAL 2 TIMES WEEKLY
Qty: 24 PATCH | Refills: 1 | Status: SHIPPED | OUTPATIENT
Start: 2025-02-20

## 2025-02-20 ASSESSMENT — PATIENT HEALTH QUESTIONNAIRE - PHQ9
SUM OF ALL RESPONSES TO PHQ9 QUESTIONS 1 & 2: 0
1. LITTLE INTEREST OR PLEASURE IN DOING THINGS: NOT AT ALL
2. FEELING DOWN, DEPRESSED OR HOPELESS: NOT AT ALL

## 2025-02-20 ASSESSMENT — PAIN SCALES - GENERAL: PAINLEVEL_OUTOF10: 0-NO PAIN

## 2025-02-20 NOTE — PROGRESS NOTES
Subjective     Patient ID: Annmarie Hutchins is a 54 y.o. female who presents for Annual Exam.  HPI    54-year-old female here for preventive care visit, last seen last year.      5/24 right breast asymmetry obtain diagnostic imaging diagnostic imaging good repeat annual screening mammo    Past medical history  - Depression - given prozac with improvement, stopped it 1.5 weeks ago previously on effexor   - Perimenopause - h/o endometrial ablation in the past, LMP 10/20. Night sweats improved with HRT + progesterone has been working well.   - low libido - on testosterone followed by Dr. Humphrey has been helping   - LBP and SI joint pain -recommended conservative measures.  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   - Father moving to [place] she will be his caretaker   -No tobacco alcohol or drug use    Lifestyle - weight gain from 138 to 146  - Diet - same diet   - Exercise - more yoga, not lifting weights   - Sleep - fine, 7-8 hours no interruptiosn.     Objective       Current Outpatient Medications:     progesterone (Prometrium) 100 mg capsule, Take 1 capsule (100 mg) by mouth once daily at bedtime., Disp: 90 capsule, Rfl: 1    testosterone 20.25 mg/1.25 gram (1.62 %) gel in metered-dose pump, Apply one pump once weekly, Disp: 75 g, Rfl: 3    estradiol (Vivelle-DOT) 0.1 mg/24 hr patch, Place 1 patch on the skin 2 times a week., Disp: 24 patch, Rfl: 1      /71   Pulse 62   Temp 36.7 °C (98 °F) (Temporal)   Wt 66.2 kg (146 lb)   SpO2 98%   BMI 25.86 kg/m²       Physical Examination:   General: Awake, alert, appears stated age   Head/eyes/ears: NCAT, EOMI, PERRL, TM WNL, no cerumen  Throat: moist mucus membranes, no pharyngeal  erythema  Neck: Supple, nontender, no lymphadenopathy, thyroid exam unremarkable   Breast exam: No palpable lumps, no discharge, no noted axillary lymphadenopathy. Chaperone offered and declined   Heart: RRR, no murmurs, rubs or gallops  Lungs: CTA bilaterally, no rhonchi rales or wheezes   Abdomen: Soft, NT/ND  Extremities: No edema, 2+ DP pulses   Skin: No concerning skin lesions on visualized skin   Neuro: AAO x 3, no FND, gait unremarkable    Assessment/Plan         Assessment & Plan  Encounter for preventative adult health care examination  Adult Health Examination  Age appropriate screening performed   Healthy lifestyle reviewed.   No additional pertinent family history or toxic habits   No high risk sexual behavior, declines STI screen   Cancer screening:   - Colonoscopy 3/22 repeat 10 years  - Mammogram due in May  - Pap smear plus HPV 1/22  - Skin cancer prevention strategies reviewed has had FSE with derm   Immunizations   - Due: Hepatitis B, shingrix declined   - UTD: flu, COVID, Tdap,   Dental and visual examinations   Discussed adequate Vitamin D intake       Orders:    CBC and Auto Differential; Future    Comprehensive Metabolic Panel; Future    Lipid Panel; Future    Hemoglobin A1C; Future    TSH with reflex to Free T4 if abnormal; Future    Lipoprotein A (LPA); Future    Screening mammogram for breast cancer    Orders:    BI mammo bilateral screening tomosynthesis    Current mild episode of major depressive disorder without prior episode (CMS-HCC)  Off prozac, symptoms stable   Advised self monitoring, will notify me if change.          Menopausal symptoms  Followed by Dr. Humphrey, improved with HRT          BMI 25.0-25.9,adult  Concerned about progressive weight gain   Extensively discussed interest in longevity including indications for hs-CRP, lifestyle adjustment with CGM, consideration for body composition and/or V02max.   Recommend further discussion at next visit   Check Lp(a)        Followup 3  months

## 2025-02-20 NOTE — PATIENT INSTRUCTIONS
Annmarie,   Depression - keep an eye on symptoms. Can do your own PHQ-9 screening questionnaire and let me know if any changes.   Dermatology for full skin exam   Diet adjustment - try [employer] continuous glucose monitor   Mammogram in May  - Call [phone] to have this scheduled.   Consider shingrix vaccine   Consider coronary artery calcium score.     Followup 3 months

## 2025-02-21 LAB
ALBUMIN SERPL-MCNC: 4.6 G/DL (ref 3.6–5.1)
ALP SERPL-CCNC: 71 U/L (ref 37–153)
ALT SERPL-CCNC: 14 U/L (ref 6–29)
ANION GAP SERPL CALCULATED.4IONS-SCNC: 8 MMOL/L (CALC) (ref 7–17)
AST SERPL-CCNC: 20 U/L (ref 10–35)
BASOPHILS # BLD AUTO: 38 CELLS/UL (ref 0–200)
BASOPHILS NFR BLD AUTO: 0.6 %
BILIRUB SERPL-MCNC: 1.3 MG/DL (ref 0.2–1.2)
BUN SERPL-MCNC: 16 MG/DL (ref 7–25)
CALCIUM SERPL-MCNC: 9.2 MG/DL (ref 8.6–10.4)
CHLORIDE SERPL-SCNC: 104 MMOL/L (ref 98–110)
CHOLEST SERPL-MCNC: 180 MG/DL
CHOLEST/HDLC SERPL: 2.5 (CALC)
CO2 SERPL-SCNC: 27 MMOL/L (ref 20–32)
CREAT SERPL-MCNC: 0.77 MG/DL (ref 0.5–1.03)
EGFRCR SERPLBLD CKD-EPI 2021: 92 ML/MIN/1.73M2
EOSINOPHIL # BLD AUTO: 38 CELLS/UL (ref 15–500)
EOSINOPHIL NFR BLD AUTO: 0.6 %
ERYTHROCYTE [DISTWIDTH] IN BLOOD BY AUTOMATED COUNT: 12.4 % (ref 11–15)
EST. AVERAGE GLUCOSE BLD GHB EST-MCNC: 108 MG/DL
EST. AVERAGE GLUCOSE BLD GHB EST-SCNC: 6 MMOL/L
GLUCOSE SERPL-MCNC: 84 MG/DL (ref 65–99)
HBA1C MFR BLD: 5.4 % OF TOTAL HGB
HCT VFR BLD AUTO: 43.3 % (ref 35–45)
HDLC SERPL-MCNC: 73 MG/DL
HGB BLD-MCNC: 14.3 G/DL (ref 11.7–15.5)
LDLC SERPL CALC-MCNC: 88 MG/DL (CALC)
LPA SERPL-SCNC: NORMAL NMOL/L
LYMPHOCYTES # BLD AUTO: 2106 CELLS/UL (ref 850–3900)
LYMPHOCYTES NFR BLD AUTO: 32.9 %
MCH RBC QN AUTO: 30.8 PG (ref 27–33)
MCHC RBC AUTO-ENTMCNC: 33 G/DL (ref 32–36)
MCV RBC AUTO: 93.1 FL (ref 80–100)
MONOCYTES # BLD AUTO: 422 CELLS/UL (ref 200–950)
MONOCYTES NFR BLD AUTO: 6.6 %
NEUTROPHILS # BLD AUTO: 3795 CELLS/UL (ref 1500–7800)
NEUTROPHILS NFR BLD AUTO: 59.3 %
NONHDLC SERPL-MCNC: 107 MG/DL (CALC)
PLATELET # BLD AUTO: 263 THOUSAND/UL (ref 140–400)
PMV BLD REES-ECKER: 10.7 FL (ref 7.5–12.5)
POTASSIUM SERPL-SCNC: 4 MMOL/L (ref 3.5–5.3)
PROT SERPL-MCNC: 6.9 G/DL (ref 6.1–8.1)
RBC # BLD AUTO: 4.65 MILLION/UL (ref 3.8–5.1)
SODIUM SERPL-SCNC: 139 MMOL/L (ref 135–146)
TRIGL SERPL-MCNC: 96 MG/DL
TSH SERPL-ACNC: 1.08 MIU/L
WBC # BLD AUTO: 6.4 THOUSAND/UL (ref 3.8–10.8)

## 2025-02-21 NOTE — ASSESSMENT & PLAN NOTE
Concerned about progressive weight gain   Extensively discussed interest in longevity including indications for hs-CRP, lifestyle adjustment with CGM, consideration for body composition and/or V02max.   Recommend further discussion at next visit   Check Lp(a)        Followup 3 months

## 2025-02-24 LAB
ALBUMIN SERPL-MCNC: 4.6 G/DL (ref 3.6–5.1)
ALP SERPL-CCNC: 71 U/L (ref 37–153)
ALT SERPL-CCNC: 14 U/L (ref 6–29)
ANION GAP SERPL CALCULATED.4IONS-SCNC: 8 MMOL/L (CALC) (ref 7–17)
AST SERPL-CCNC: 20 U/L (ref 10–35)
BASOPHILS # BLD AUTO: 38 CELLS/UL (ref 0–200)
BASOPHILS NFR BLD AUTO: 0.6 %
BILIRUB SERPL-MCNC: 1.3 MG/DL (ref 0.2–1.2)
BUN SERPL-MCNC: 16 MG/DL (ref 7–25)
CALCIUM SERPL-MCNC: 9.2 MG/DL (ref 8.6–10.4)
CHLORIDE SERPL-SCNC: 104 MMOL/L (ref 98–110)
CHOLEST SERPL-MCNC: 180 MG/DL
CHOLEST/HDLC SERPL: 2.5 (CALC)
CO2 SERPL-SCNC: 27 MMOL/L (ref 20–32)
CREAT SERPL-MCNC: 0.77 MG/DL (ref 0.5–1.03)
EGFRCR SERPLBLD CKD-EPI 2021: 92 ML/MIN/1.73M2
EOSINOPHIL # BLD AUTO: 38 CELLS/UL (ref 15–500)
EOSINOPHIL NFR BLD AUTO: 0.6 %
ERYTHROCYTE [DISTWIDTH] IN BLOOD BY AUTOMATED COUNT: 12.4 % (ref 11–15)
EST. AVERAGE GLUCOSE BLD GHB EST-MCNC: 108 MG/DL
EST. AVERAGE GLUCOSE BLD GHB EST-SCNC: 6 MMOL/L
GLUCOSE SERPL-MCNC: 84 MG/DL (ref 65–99)
HBA1C MFR BLD: 5.4 % OF TOTAL HGB
HCT VFR BLD AUTO: 43.3 % (ref 35–45)
HDLC SERPL-MCNC: 73 MG/DL
HGB BLD-MCNC: 14.3 G/DL (ref 11.7–15.5)
LDLC SERPL CALC-MCNC: 88 MG/DL (CALC)
LPA SERPL-SCNC: <10 NMOL/L
LYMPHOCYTES # BLD AUTO: 2106 CELLS/UL (ref 850–3900)
LYMPHOCYTES NFR BLD AUTO: 32.9 %
MCH RBC QN AUTO: 30.8 PG (ref 27–33)
MCHC RBC AUTO-ENTMCNC: 33 G/DL (ref 32–36)
MCV RBC AUTO: 93.1 FL (ref 80–100)
MONOCYTES # BLD AUTO: 422 CELLS/UL (ref 200–950)
MONOCYTES NFR BLD AUTO: 6.6 %
NEUTROPHILS # BLD AUTO: 3795 CELLS/UL (ref 1500–7800)
NEUTROPHILS NFR BLD AUTO: 59.3 %
NONHDLC SERPL-MCNC: 107 MG/DL (CALC)
PLATELET # BLD AUTO: 263 THOUSAND/UL (ref 140–400)
PMV BLD REES-ECKER: 10.7 FL (ref 7.5–12.5)
POTASSIUM SERPL-SCNC: 4 MMOL/L (ref 3.5–5.3)
PROT SERPL-MCNC: 6.9 G/DL (ref 6.1–8.1)
RBC # BLD AUTO: 4.65 MILLION/UL (ref 3.8–5.1)
SODIUM SERPL-SCNC: 139 MMOL/L (ref 135–146)
TRIGL SERPL-MCNC: 96 MG/DL
TSH SERPL-ACNC: 1.08 MIU/L
WBC # BLD AUTO: 6.4 THOUSAND/UL (ref 3.8–10.8)

## 2025-02-25 ENCOUNTER — OFFICE VISIT (OUTPATIENT)
Dept: DERMATOLOGY | Facility: CLINIC | Age: 55
End: 2025-02-25
Payer: COMMERCIAL

## 2025-02-25 DIAGNOSIS — D22.9 MULTIPLE BENIGN NEVI: Primary | ICD-10-CM

## 2025-02-25 DIAGNOSIS — Z12.83 SCREENING EXAM FOR SKIN CANCER: ICD-10-CM

## 2025-02-25 DIAGNOSIS — L81.4 LENTIGO: ICD-10-CM

## 2025-02-25 PROCEDURE — 99214 OFFICE O/P EST MOD 30 MIN: CPT | Performed by: DERMATOLOGY

## 2025-02-25 PROCEDURE — 1036F TOBACCO NON-USER: CPT | Performed by: DERMATOLOGY

## 2025-02-25 RX ORDER — TRETINOIN 0.25 MG/G
CREAM TOPICAL NIGHTLY
Qty: 45 G | Refills: 3 | Status: SHIPPED | OUTPATIENT
Start: 2025-02-25 | End: 2026-02-25

## 2025-02-25 ASSESSMENT — DERMATOLOGY QUALITY OF LIFE (QOL) ASSESSMENT
RATE HOW BOTHERED YOU ARE BY EFFECTS OF YOUR SKIN PROBLEMS ON YOUR ACTIVITIES (EG, GOING OUT, ACCOMPLISHING WHAT YOU WANT, WORK ACTIVITIES OR YOUR RELATIONSHIPS WITH OTHERS): 0 - NEVER BOTHERED
DATE THE QUALITY-OF-LIFE ASSESSMENT WAS COMPLETED: 67261
RATE HOW EMOTIONALLY BOTHERED YOU ARE BY YOUR SKIN PROBLEM (FOR EXAMPLE, WORRY, EMBARRASSMENT, FRUSTRATION): 0 - NEVER BOTHERED
RATE HOW BOTHERED YOU ARE BY SYMPTOMS OF YOUR SKIN PROBLEM (EG, ITCHING, STINGING BURNING, HURTING OR SKIN IRRITATION): 0 - NEVER BOTHERED
ARE THERE EXCLUSIONS OR EXCEPTIONS FOR THE QUALITY OF LIFE ASSESSMENT: NO

## 2025-02-25 ASSESSMENT — PATIENT GLOBAL ASSESSMENT (PGA): PATIENT GLOBAL ASSESSMENT: PATIENT GLOBAL ASSESSMENT:  1 - CLEAR

## 2025-02-25 ASSESSMENT — DERMATOLOGY PATIENT ASSESSMENT
DO YOU USE SUNSCREEN: OCCASIONALLY
ARE YOU AN ORGAN TRANSPLANT RECIPIENT: NO
DO YOU HAVE ANY NEW OR CHANGING LESIONS: NO
ARE YOU TRYING TO GET PREGNANT: NO
ARE YOU ON BIRTH CONTROL: NO
HAVE YOU HAD OR DO YOU HAVE A STAPH INFECTION: NO
DO YOU HAVE IRREGULAR MENSTRUAL CYCLES: NO
HAVE YOU HAD OR DO YOU HAVE VASCULAR DISEASE: NO
DO YOU USE A TANNING BED: NO

## 2025-02-25 ASSESSMENT — ITCH NUMERIC RATING SCALE: HOW SEVERE IS YOUR ITCHING?: 0

## 2025-02-25 NOTE — PROGRESS NOTES
Marciano Hutchins is a 54 y.o. female who presents for the following: Skin Check. Last derm visit 11/2022 for Full Skin Exam and lentigines. No history of skin cancer. No history of dysplastic nevi.     Review of Systems:  No other skin or systemic complaints other than what is documented elsewhere in the note.    The following portions of the chart were reviewed this encounter and updated as appropriate:         Skin Cancer History  No skin cancer on file.      Specialty Problems    None       Objective   Well appearing patient in no apparent distress; mood and affect are within normal limits.    A full examination was performed including scalp, head, eyes, ears, nose, lips, neck, chest, axillae, abdomen, back, buttocks, bilateral upper extremities, bilateral lower extremities, hands, feet, fingers, toes, fingernails, and toenails. All findings within normal limits unless otherwise noted below.    Assessment/Plan   1. Multiple benign nevi  Brown and tan macules and papules with reassuring findings on dermoscopy    -These lesions have benign, reassuring patterns on dermoscopy  -Recommend continued self observation, and to contact the office if any changes in nevi are noticed    2. Lentigo  Tan macules scattered on cheeks, more than prior visit                  -Benign appearing on exam  -Reassurance, recommend observation    - 11/2022 Rx for compounded tretinoin / hydroquinone given with some improvement but then worsening again    - emphasized importance of daily mineral sunscreen  - plan to start with tretinoin. If further treatment required can schedule a follow up to discuss further    Related Medications  tretinoin (Retin-A) 0.025 % cream  Apply topically once daily at bedtime. A pea-sized amount to the whole face, start every 2-3 nights and gradually increase to nightly    3. Screening exam for skin cancer    Full body skin exam  -No lesions concerning for malignancy on the remainder the skin exam today    - The ugly duckling sign was discussed. Monitor for any skin lesions that are different in color, shape, or size than others on body  -Sun protection was discussed. Recommend SPF 30+, hats with brims, sun protective clothing, and avoiding sun exposure between 10 AM and 2 PM whenever possible  -Recommend regular skin exams or sooner if new or changing lesions           Follow up 2-3 years Full Skin Exam   Refills x1 year

## 2025-03-05 NOTE — PROGRESS NOTES
FOLLOW-UP VISIT       HISTORY OF PRESENT ILLNESS:   Annmarie Hutchins is a 54 y.o. female who presents to me today for follow-up of menopausal symptoms and HSDD.     At her last visit, we increased the dose of her estradiol patches to 0.075 mg's and refilled her progesterone capsules. She was then switched to the 0.1 mg patch on 1/28/25 due to experiencing night sweats. Patient reports continued vulvar sweating and notes having to change her underwear every morning.       For HSDD, we started the patient on testosterone gel and ordered testosterone labs. Patient reports to be doing well with her current dose of testosterone.     PAST MEDICAL HISTORY:  Past Medical History:   Diagnosis Date    Asymptomatic menopausal state 03/29/2022    History of menopause    Personal history of other diseases of the musculoskeletal system and connective tissue 01/14/2022    History of osteopenia    Reaction to severe stress, unspecified 01/24/2022    Stress       PAST SURGICAL HISTORY:  Past Surgical History:   Procedure Laterality Date    ENDOMETRIAL ABLATION      SALPINGECTOMY          ALLERGIES:   No Known Allergies     MEDICATIONS:   Medication Documentation Review Audit       Reviewed by Gertrudis Castro MA (Medical Assistant) on 02/25/25 at 1303      Medication Order Taking? Sig Documenting Provider Last Dose Status     Discontinued 02/20/25 1605   estradiol (Vivelle-DOT) 0.1 mg/24 hr patch [id number]  Place 1 patch on the skin 2 times a week. Pamela Humphrey MD MPH  Active   Discontinued 02/20/25 1320   progesterone (Prometrium) 100 mg capsule [id number]  Take 1 capsule (100 mg) by mouth once daily at bedtime. Pamela Humphrey MD MPH  Active   testosterone 20.25 mg/1.25 gram (1.62 %) gel in metered-dose pump [id number]  Apply one pump once weekly Pamela Humphrey MD MPH  Active                     SOCIAL HISTORY:  Patient  reports that she has never smoked. She has never used smokeless tobacco. She reports current alcohol use of about 1.0 standard  drink of alcohol per week. She reports that she does not use drugs.   Social History     Socioeconomic History    Marital status:      Spouse name: Not on file    Number of children: Not on file    Years of education: Not on file    Highest education level: Not on file   Occupational History    Not on file   Tobacco Use    Smoking status: Never    Smokeless tobacco: Never   Substance and Sexual Activity    Alcohol use: Yes     Alcohol/week: 1.0 standard drink of alcohol     Types: 1 Standard drinks or equivalent per week    Drug use: Never    Sexual activity: Not on file   Other Topics Concern    Not on file   Social History Narrative    Not on file     Social Drivers of Health     Financial Resource Strain: Not on File (10/8/2020)    [facility]    Financial Resource Strain     Financial Resource Strain: 0   Food Insecurity: Not on File (10/8/2020)    [facility] from [facility]    Food Insecurity     Food: 0   Transportation Needs: Not on File (10/8/2020)    Received from [facility]    Transportation Needs     Transportation: 0   Physical Activity: Not on File (10/8/2020)    [facility] from [facility]    Physical Activity     Physical Activity: 0   Stress: Not on File (10/8/2020)    [facility] from [facility]    Stress     Stress: 0   Social Connections: Not on File (10/8/2020)    Received from [facility]    Social Connections     Social Connections and Isolation: 0   Intimate Partner Violence: Not on file   Housing Stability: Not on File (10/8/2020)    [facility] from [facility]    Housing Stability     Housin       FAMILY HISTORY:  No family history on file.    REVIEW OF SYSTEMS:  Constitutional: Negative for fever and chills. Denies anorexia, weight loss.  Eyes: Negative for visual disturbance.   Respiratory: Negative for shortness of breath.    Cardiovascular: Negative for chest pain.   Gastrointestinal: Negative for nausea and vomiting.   Genitourinary: See interval history  above.  Skin: Negative for rash.   Neurological: Negative for dizziness and numbness.   Psychiatric/Behavioral: Negative for confusion and decreased concentration.     PHYSICAL EXAM:  There were no vitals taken for this visit.  Virtual appointment, patient appears well.      LABORATORY REVIEW:   Component      Latest Ref Rng 12/5/2024   Testosterone      0 - 70 ng/dL <30       Assessment:      1. Menopausal symptoms        2. Hypoactive sexual desire disorder  Testosterone    Testosterone          Annmarie Hutchins is a 54 y.o. female with menopausal symptoms and HSDD.     We discussed having her start vaginal estrogen cream for her vulvar sweating. Prescription sent to the patient's pharmacy. We also discussed having the patient start tretinoin cream, prescription sent to the pharmacy. She will start off taking tretinoin once a week followed by every other day and eventually use daily. She will need to use a sunscreen with SPF during the day due to tretinoin making her skin more sensitive. Patient verbalized understanding and will follow-up in 3 months.      Plan:   Prescription for tretinoin 0.025% cream sent to the patient's pharmacy.    Prescription for estradiol 0.01% vaginal cream sent to the patient's pharmacy.      Order testosterone labs.  Follow-up in 3 months.     Pamela Humphrey MD MPH    Scribe Attestation  By signing my name below, IAdriana, Scrbebeto   attest that this documentation has been prepared under the direction and in the presence of Pamela Humphrey MD MPH.

## 2025-03-06 ENCOUNTER — APPOINTMENT (OUTPATIENT)
Dept: UROLOGY | Facility: CLINIC | Age: 55
End: 2025-03-06
Payer: COMMERCIAL

## 2025-03-06 DIAGNOSIS — F52.0 HYPOACTIVE SEXUAL DESIRE DISORDER: ICD-10-CM

## 2025-03-06 DIAGNOSIS — N95.1 MENOPAUSAL SYMPTOMS: ICD-10-CM

## 2025-03-06 PROCEDURE — 99213 OFFICE O/P EST LOW 20 MIN: CPT | Performed by: OBSTETRICS & GYNECOLOGY

## 2025-03-06 RX ORDER — ESTRADIOL 10 UG/1
10 TABLET, FILM COATED VAGINAL 2 TIMES WEEKLY
Qty: 24 TABLET | Refills: 1 | Status: CANCELLED | OUTPATIENT
Start: 2025-03-06

## 2025-03-11 RX ORDER — TRETINOIN 0.25 MG/G
CREAM TOPICAL
Qty: 20 G | Refills: 0 | Status: SHIPPED | OUTPATIENT
Start: 2025-03-11

## 2025-03-11 RX ORDER — ESTRADIOL 0.1 MG/G
CREAM VAGINAL
Qty: 42.5 G | Refills: 0 | Status: SHIPPED | OUTPATIENT
Start: 2025-03-11

## 2025-03-11 RX ORDER — ESTRADIOL 0.1 MG/G
CREAM VAGINAL
Qty: 42.5 G | Refills: 2 | Status: SHIPPED | OUTPATIENT
Start: 2025-03-11

## 2025-03-11 RX ORDER — TRETINOIN 0.25 MG/G
CREAM TOPICAL
Qty: 45 G | Refills: 1 | Status: SHIPPED | OUTPATIENT
Start: 2025-03-11

## 2025-03-15 DIAGNOSIS — N95.1 MENOPAUSAL SYMPTOMS: ICD-10-CM

## 2025-03-17 RX ORDER — ESTRADIOL 0.1 MG/G
CREAM VAGINAL
Qty: 42.5 G | Refills: 3 | Status: SHIPPED | OUTPATIENT
Start: 2025-03-17

## 2025-05-02 LAB — TESTOST SERPL-MCNC: 27 NG/DL (ref 2–45)

## 2025-05-15 ENCOUNTER — HOSPITAL ENCOUNTER (OUTPATIENT)
Dept: RADIOLOGY | Facility: CLINIC | Age: 55
Discharge: HOME | End: 2025-05-15
Payer: COMMERCIAL

## 2025-05-15 VITALS — HEIGHT: 63 IN | BODY MASS INDEX: 25.86 KG/M2 | WEIGHT: 145.94 LBS

## 2025-05-15 PROCEDURE — 77063 BREAST TOMOSYNTHESIS BI: CPT | Performed by: RADIOLOGY

## 2025-05-15 PROCEDURE — 77067 SCR MAMMO BI INCL CAD: CPT | Performed by: RADIOLOGY

## 2025-05-15 PROCEDURE — 77067 SCR MAMMO BI INCL CAD: CPT

## 2025-05-18 ENCOUNTER — OFFICE VISIT (OUTPATIENT)
Dept: URGENT CARE | Age: 55
End: 2025-05-18
Payer: COMMERCIAL

## 2025-05-18 ENCOUNTER — APPOINTMENT (OUTPATIENT)
Dept: RADIOLOGY | Facility: HOSPITAL | Age: 55
End: 2025-05-18
Payer: COMMERCIAL

## 2025-05-18 ENCOUNTER — HOSPITAL ENCOUNTER (EMERGENCY)
Facility: HOSPITAL | Age: 55
Discharge: HOME | End: 2025-05-18
Payer: COMMERCIAL

## 2025-05-18 VITALS
SYSTOLIC BLOOD PRESSURE: 138 MMHG | WEIGHT: 140 LBS | HEIGHT: 63 IN | HEART RATE: 70 BPM | DIASTOLIC BLOOD PRESSURE: 90 MMHG | TEMPERATURE: 97.9 F | RESPIRATION RATE: 18 BRPM | OXYGEN SATURATION: 99 % | BODY MASS INDEX: 24.8 KG/M2

## 2025-05-18 VITALS
RESPIRATION RATE: 17 BRPM | DIASTOLIC BLOOD PRESSURE: 86 MMHG | SYSTOLIC BLOOD PRESSURE: 132 MMHG | OXYGEN SATURATION: 96 % | TEMPERATURE: 98.1 F | HEART RATE: 65 BPM

## 2025-05-18 DIAGNOSIS — S61.112A LACERATION OF LEFT THUMB WITHOUT FOREIGN BODY WITH DAMAGE TO NAIL, INITIAL ENCOUNTER: Primary | ICD-10-CM

## 2025-05-18 DIAGNOSIS — S61.319A LACERATION OF NAIL BED OF FINGER, INITIAL ENCOUNTER: Primary | ICD-10-CM

## 2025-05-18 PROCEDURE — 99283 EMERGENCY DEPT VISIT LOW MDM: CPT

## 2025-05-18 PROCEDURE — 12001 RPR S/N/AX/GEN/TRNK 2.5CM/<: CPT

## 2025-05-18 PROCEDURE — 73130 X-RAY EXAM OF HAND: CPT | Mod: LT

## 2025-05-18 PROCEDURE — 73130 X-RAY EXAM OF HAND: CPT | Mod: LEFT SIDE | Performed by: RADIOLOGY

## 2025-05-18 ASSESSMENT — COLUMBIA-SUICIDE SEVERITY RATING SCALE - C-SSRS
6. HAVE YOU EVER DONE ANYTHING, STARTED TO DO ANYTHING, OR PREPARED TO DO ANYTHING TO END YOUR LIFE?: NO
2. HAVE YOU ACTUALLY HAD ANY THOUGHTS OF KILLING YOURSELF?: NO
1. IN THE PAST MONTH, HAVE YOU WISHED YOU WERE DEAD OR WISHED YOU COULD GO TO SLEEP AND NOT WAKE UP?: NO

## 2025-05-18 ASSESSMENT — PAIN SCALES - GENERAL: PAINLEVEL_OUTOF10: 0 - NO PAIN

## 2025-05-18 ASSESSMENT — PAIN - FUNCTIONAL ASSESSMENT: PAIN_FUNCTIONAL_ASSESSMENT: 0-10

## 2025-05-18 NOTE — DISCHARGE INSTRUCTIONS
Referral for orthopedics hand placed. Please follow up for further evaluation and management. You can stop at the  in waiting room to schedule an appointment before you leave today.  Please return to ED for any new or worsening symptoms

## 2025-05-18 NOTE — ED TRIAGE NOTES
Pt states that she sliced her thumb in a mandolin chopper at home. Pt presents to triage with laceration to the thumb of her left hand. Dressing placed in triage bleeding controlled.

## 2025-05-19 NOTE — ED PROVIDER NOTES
HPI   CC: Laceration     Patient is a 54-year-old female with benign PMH presenting to ED with concern for left thumb laceration.  Patient states at 1 PM she was cleaning the blade of her mandolin and took it out of a package and sliced the tip of her left thumb.  She tried running it under water and wrapped it in a paper towel.  She currently endorses pain at the tip of her thumb.  She does have full range of motion to her left thumb.  Last tetanus was on 12/2023.  She denies any immunocompromise including diabetes, cancer, chemotherapy, steroid use, or HIV.  She is right-handed.  She denies any foreign body sensation to the area.  Patient was seen in urgent care who referred her to the ED.        ROS: 10-point review of systems was performed and is otherwise negative except as noted in HPI.    Limitations to history: N/A  Independent Historians: Self   External Records Reviewed: Outpatient notes in EMR    Past Medical History: Noncontributory except per HPI     Past Surgical History: Noncontributory except per HPI     Family History: Reviewed and noncontributory     Social History:  Denies tobacco. Denies ETOH. Denies illicit drugs.    RX Allergies[1]    Home Meds:   Current Outpatient Medications   Medication Instructions    estradiol (Estrace) 0.01 % (0.1 mg/gram) vaginal cream Insert a pea-sized amount into vagina three times per week at bedtime    estradiol (Estrace) 0.01 % (0.1 mg/gram) vaginal cream Insert a pea-sized amount into vagina three times per week at bedtime    estradiol (Estrace) 0.01 % (0.1 mg/gram) vaginal cream Insert a pea-sized amount into the vagina at bedtime three times per week    estradiol (Vivelle-DOT) 0.1 mg/24 hr patch 1 patch, transdermal, 2 times weekly    progesterone (PROMETRIUM) 100 mg, oral, Nightly    testosterone 20.25 mg/1.25 gram (1.62 %) gel in metered-dose pump Apply one pump once weekly    tretinoin (Retin-A) 0.025 % cream Topical, Nightly, A pea-sized amount to the whole  face, start every 2-3 nights and gradually increase to nightly    tretinoin (Retin-A) 0.025 % cream Apply once per week until well tolerated. Then increase to 3 times per week until well tolerated. Then increase to nightly.    tretinoin (Retin-A) 0.025 % cream Apply once per week until well tolerated. Then increase to 3 times per week until well tolerated. Then increase to nightly.        Physical Exam     ED Triage Vitals   Temperature Heart Rate Respirations BP   05/18/25 1500 05/18/25 1500 05/18/25 1500 05/18/25 1502   36.6 °C (97.9 °F) 70 18 138/90      Pulse Ox Temp Source Heart Rate Source Patient Position   05/18/25 1500 05/18/25 1500 05/18/25 1500 --   99 % Temporal Monitor       BP Location FiO2 (%)     -- --               Vitals and nursing note reviewed.   Physical Exam  Constitutional:       General: She is not in acute distress.     Appearance: Normal appearance. She is not ill-appearing, toxic-appearing or diaphoretic.   Cardiovascular:      Rate and Rhythm: Normal rate and regular rhythm.      Heart sounds: Normal heart sounds. No murmur heard.     No friction rub. No gallop.      Comments: Radial pulse 2+ bilaterally  Pulmonary:      Effort: Pulmonary effort is normal. No respiratory distress.      Breath sounds: Normal breath sounds. No stridor. No wheezing, rhonchi or rales.   Musculoskeletal:         General: Normal range of motion.      Comments: Range of motion full to left thumb flexion/extension/opposition/abduction/adduction.   Skin:     General: Skin is warm and dry.      Capillary Refill: Capillary refill takes less than 2 seconds.      Comments: Avulsion laceration to distal tip of left thumb as in photo below   Neurological:      General: No focal deficit present.      Mental Status: She is alert and oriented to person, place, and time.   Psychiatric:         Mood and Affect: Mood normal.         Behavior: Behavior normal.         Diagnostic Results      Labs Reviewed - No data to display       XR hand left 3+ views   Final Result   No acute abnormality seen.             MACRO:   None        Signed by: Sanford Parr 5/18/2025 4:15 PM   Dictation workstation:   [id number]          ED Course & MDM   Assessment/Plan:   Medications - No data to display   ED Course as of 05/22/25 2339   Sun May 18, 2025   2024 Patient is a 54-year-old female presenting to the ED with concern for laceration to distal tip of left thumb.  Vital signs are stable, patient is nontoxic-appearing.  She is neurovascularly intact in left upper extremity.  X-rays of left hand obtained is negative for any acute fracture or dislocation.  Patient is a slow bleed from the tip of the finger, I suspect most likely a venous bleed.  Patient soaked left thumb in Hibiclens and sterile saline. Tourniquet was applied to base of left thumb and direct pressure was held on top of finger to control bleeding.  Dermabond glue was applied over area of avulsion.  Bleeding is controlled.  Referral for orthopedic hand surgeon placed encourage patient for follow-up for further evaluation and management.  Patient understands and is agreeable with plan.  Patient told to return to ED for any new or worsening symptoms. [VS]      ED Course User Index  [VS] Chicho Romero PA-C         Diagnoses as of 05/22/25 2339   Laceration of left thumb without foreign body with damage to nail, initial encounter       ED Prescriptions    None         Chronic Medical Conditions Significantly Affecting Care:      Escalation of Care: Appropriate for outpatient management    Counseling: Spoke with the patient and discussed today´s findings, in addition to providing specific details for the plan of care and expected course.  Patient was given the opportunity to ask questions.    Discussed return precautions and importance of follow-up.  Advised to follow-up with orthopedic hand surgeon.  Advised to return to the ED for changing or worsening symptoms, new symptoms,  complaint specific precautions, and precautions listed on the discharge paperwork.    I advised the patient that the emergency evaluation and treatment provided today doesn't end their need for medical care. It is very important that they follow-up with their primary care provider or other specialist as instructed.    The plan of care was mutually agreed upon with the patient. The patient and/or family were given the opportunity to ask questions. All questions asked today in the ED were answered to the best of my ability with today's information.    I specifically advised the patient to return to the ED for changing or worsening symptoms, worrisome new symptoms, or for any complaint specific precautions listed on the discharge paperwork.    Procedure  Procedures         Chicho Rmoero PA-C  05/18/25 2026       [1] No Known Allergies       Chicho Romero PA-C  05/22/25 [id number]

## 2025-05-28 ENCOUNTER — OFFICE VISIT (OUTPATIENT)
Dept: ORTHOPEDIC SURGERY | Facility: HOSPITAL | Age: 55
End: 2025-05-28
Payer: COMMERCIAL

## 2025-05-28 VITALS — BODY MASS INDEX: 24.8 KG/M2 | HEIGHT: 63 IN | WEIGHT: 140 LBS

## 2025-05-28 DIAGNOSIS — S61.112A LACERATION OF LEFT THUMB WITHOUT FOREIGN BODY WITH DAMAGE TO NAIL, INITIAL ENCOUNTER: ICD-10-CM

## 2025-05-28 PROCEDURE — 99202 OFFICE O/P NEW SF 15 MIN: CPT | Performed by: STUDENT IN AN ORGANIZED HEALTH CARE EDUCATION/TRAINING PROGRAM

## 2025-05-28 PROCEDURE — 3008F BODY MASS INDEX DOCD: CPT | Performed by: STUDENT IN AN ORGANIZED HEALTH CARE EDUCATION/TRAINING PROGRAM

## 2025-05-28 PROCEDURE — 99204 OFFICE O/P NEW MOD 45 MIN: CPT | Performed by: STUDENT IN AN ORGANIZED HEALTH CARE EDUCATION/TRAINING PROGRAM

## 2025-05-28 ASSESSMENT — PAIN - FUNCTIONAL ASSESSMENT: PAIN_FUNCTIONAL_ASSESSMENT: NO/DENIES PAIN

## 2025-05-29 PROBLEM — F52.0 HYPOACTIVE SEXUAL DESIRE DISORDER: Status: ACTIVE | Noted: 2025-05-29

## 2025-05-29 NOTE — PROGRESS NOTES
CHIEF COMPLAINT: Left thumb tip injury  DOI: 5/18/2025  DOS: None      HISTORY OF PRESENT ILLNESS    This is a very pleasant 54-year-old female, right-hand-dominant, denies active tobacco use diabetes diabetes denies anticoagulation use denies prior surgeries to her hands that is presenting as new patient evaluation for a left thumb tip injury that she sustained on the above date on a mandolin.  She noted immediate bleeding.  She presented to urgent care where it was glued.  X-rays were obtained which showed no signs of fracture.  Patient reports been doing well.  She thinks that is on its way to for healing.  She has been covering it with a simple bandage.    PHYSICAL EXAM    Extremities / Musculoskeletal:    Left hand:  Well-healing granulation tissue at the site of a dorsal oblique partial amputation of the tip of the thumb.  No signs of infection no signs of drainage no signs of exposed bone.  Thumb IP flexion extension intact.    HgA1c:   Lab Results   Component Value Date    HGBA1C 5.4 02/20/2025    QMSLNOLN6I 108 02/20/2025       IMAGING / LABS / EMGs:    Left hand x-ray series performed on 5/18/2025 inability reviewed demonstrate stable global alignment.  No signs of acute fracture or dislocation.    Medical History[1]    Medication Documentation Review Audit       Reviewed by Ana Duff LPN (Licensed Nurse) on 05/28/25 at 0823      Medication Order Taking? Sig Documenting Provider Last Dose Status   estradiol (Estrace) 0.01 % (0.1 mg/gram) vaginal cream [id number]  Insert a pea-sized amount into vagina three times per week at bedtime Pamela Humphrey MD MPH  Active   estradiol (Estrace) 0.01 % (0.1 mg/gram) vaginal cream [id number]  Insert a pea-sized amount into vagina three times per week at bedtime Pamela Humphrey MD MPH  Active   estradiol (Estrace) 0.01 % (0.1 mg/gram) vaginal cream [id number]  Insert a pea-sized amount into the vagina at bedtime three times per week Pamela Humphrey MD MPH  Active    estradiol (Vivelle-DOT) 0.1 mg/24 hr patch [id number]  Place 1 patch on the skin 2 times a week. Pamela Humphrey MD MPH  Active   progesterone (Prometrium) 100 mg capsule [id number]  Take 1 capsule (100 mg) by mouth once daily at bedtime. Pamela Humphrey MD MPH  Active   testosterone 20.25 mg/1.25 gram (1.62 %) gel in metered-dose pump [id number]  Apply one pump once weekly Pamela Humphrey MD MPH  Active   tretinoin (Retin-A) 0.025 % cream [id number]  Apply topically once daily at bedtime. A pea-sized amount to the whole face, start every 2-3 nights and gradually increase to nightly Aleyda Burk MD  Active   tretinoin (Retin-A) 0.025 % cream [id number]  Apply once per week until well tolerated. Then increase to 3 times per week until well tolerated. Then increase to nightly. Pamela Humphrey MD MPH  Active   tretinoin (Retin-A) 0.025 % cream [id number]  Apply once per week until well tolerated. Then increase to 3 times per week until well tolerated. Then increase to nightly. Pamela Humphrey MD MPH  Active                    RX Allergies[2]    Surgical History[3]      ASSESSMENT:   Left thumb tip injury    Patient is healing up well there is certainly no intervention I would recommend.  Recommend basic wound care and demonstrated Coban wrapping to pad the wound      Follow-up in: Although we do not need a scheduled follow-up visit at this time, I encouraged the patient to return to clinic if they have any concerns or issues whatsoever. The patient was provided with my office's contact information.     XR at next visit: none    The diagnosis and treatment plan were reviewed with the patient. All questions were answered. The patient verbalized understanding of the treatment plan. There were no barriers to understanding identified.     Note dictated with [employer] software.  Completed without full type editing and sent to avoid delay.    Toby Altman M.D.    Department of  Orthopaedics  Hand/Upper Extremity  Phone: [phone]  Appt. Phone: [phone]\         [1]   Past Medical History:  Diagnosis Date    Asymptomatic menopausal state 03/29/2022    History of menopause    Personal history of other diseases of the musculoskeletal system and connective tissue 01/14/2022    History of osteopenia    Reaction to severe stress, unspecified 01/24/2022    Stress   [2] No Known Allergies  [3]   Past Surgical History:  Procedure Laterality Date    ENDOMETRIAL ABLATION      SALPINGECTOMY

## 2025-05-29 NOTE — PROGRESS NOTES
FOLLOW-UP VISIT       HISTORY OF PRESENT ILLNESS:   Annmarie Hutchins is a 54 y.o. female presenting to me today for follow-up of menopausal symptoms and HSDD. Patient was last evaluated on 3/6/25. At that visit we increased the dose of her estradiol patches to 0.1 mg, started her on vaginal estrogen cream due to vulvar sweating, and started her on tretinoin cream for acne. Total testosterone panel done 4/29/25 showed a total testosterone of 27.     PAST MEDICAL HISTORY:  Medical History[1]    PAST SURGICAL HISTORY:  Surgical History[2]     ALLERGIES:   Allergies[3]     MEDICATIONS:   Medication Documentation Review Audit       Reviewed by Ana Duff LPN (Licensed Nurse) on 05/28/25 at 0823      Medication Order Taking? Sig Documenting Provider Last Dose Status   estradiol (Estrace) 0.01 % (0.1 mg/gram) vaginal cream [id number]  Insert a pea-sized amount into vagina three times per week at bedtime Pamela Humphrey MD MPH  Active   estradiol (Estrace) 0.01 % (0.1 mg/gram) vaginal cream [id number]  Insert a pea-sized amount into vagina three times per week at bedtime Pamela Humphrey MD MPH  Active   estradiol (Estrace) 0.01 % (0.1 mg/gram) vaginal cream [id number]  Insert a pea-sized amount into the vagina at bedtime three times per week Pamela Humphrey MD MPH  Active   estradiol (Vivelle-DOT) 0.1 mg/24 hr patch [id number]  Place 1 patch on the skin 2 times a week. Pamela Humphrey MD MPH  Active   progesterone (Prometrium) 100 mg capsule [id number]  Take 1 capsule (100 mg) by mouth once daily at bedtime. Pamela Humphrey MD MPH  Active   testosterone 20.25 mg/1.25 gram (1.62 %) gel in metered-dose pump [id number]  Apply one pump once weekly Pamela Humphrey MD MPH  Active   tretinoin (Retin-A) 0.025 % cream [id number]  Apply topically once daily at bedtime. A pea-sized amount to the whole face, start every 2-3 nights and gradually increase to nightly Aleyda Burk MD  Active   tretinoin (Retin-A) 0.025 % cream [id number]  Apply once per week  until well tolerated. Then increase to 3 times per week until well tolerated. Then increase to nightly. Pamela Humphrey MD MPH  Active   tretinoin (Retin-A) 0.025 % cream [id number]  Apply once per week until well tolerated. Then increase to 3 times per week until well tolerated. Then increase to nightly. Pamela Humphrey MD MPH  Active                     SOCIAL HISTORY:  Patient  reports that she has never smoked. She has never used smokeless tobacco. She reports current alcohol use of about 1.0 standard drink of alcohol per week. She reports that she does not use drugs.   Social History     Socioeconomic History    Marital status:      Spouse name: Not on file    Number of children: Not on file    Years of education: Not on file    Highest education level: Not on file   Occupational History    Not on file   Tobacco Use    Smoking status: Never    Smokeless tobacco: Never   Substance and Sexual Activity    Alcohol use: Yes     Alcohol/week: 1.0 standard drink of alcohol     Types: 1 Standard drinks or equivalent per week    Drug use: Never    Sexual activity: Not on file   Other Topics Concern    Not on file   Social History Narrative    Not on file     Social Drivers of Health     Financial Resource Strain: Not on File (10/8/2020)    Received from [facility]    Financial Resource Strain     Financial Resource Strain: 0   Food Insecurity: Not on File (10/8/2020)    Received from [facility]    Food Insecurity     Food: 0   Transportation Needs: Not on File (10/8/2020)    Received from [facility]    Transportation Needs     Transportation: 0   Physical Activity: Not on File (10/8/2020)    Received from [facility]    Physical Activity     Physical Activity: 0   Stress: Not on File (10/8/2020)    Received from [facility]    Stress     Stress: 0   Social Connections: Not on File (10/8/2020)    Received from [facility]    Social Connections     Social Connections and Isolation: 0   Intimate Partner Violence: Not on file   Housing Stability: Not on  File (10/8/2020)    Received from [facility]     Housin       FAMILY HISTORY:  Family History[4]    REVIEW OF SYSTEMS:  Constitutional: Negative for fever and chills. Denies anorexia, weight loss.  Eyes: Negative for visual disturbance.   Respiratory: Negative for shortness of breath.    Cardiovascular: Negative for chest pain.   Gastrointestinal: Negative for nausea and vomiting.   Genitourinary: See interval history above.  Skin: Negative for rash.   Neurological: Negative for dizziness and numbness.   Psychiatric/Behavioral: Negative for confusion and decreased concentration.     PHYSICAL EXAM:  There were no vitals taken for this visit.  Virtual appointment, patient appears well.     LABORATORY REVIEW:   Component      Latest Ref Rng 2025   TESTOSTERONE, TOTAL, MS      2 - 45 ng/dL 27       Assessment:      1. Menopausal symptoms        2. Hypoactive sexual desire disorder            Annmarie Hutchins is a 54 y.o. female with menopausal symptoms and HSDD.      Plan:   ***      I spent *** minutes of dedicated E&M time, including preparation and review of records, notes, and data, time spent with patient/family, and documentation.      [unfilled]    Scribe Attestation  By signing my name below, IAdriana Scribe   attest that this documentation has been prepared under the direction and in the presence of Pamela Humphrey MD MPH.        [1]   Past Medical History:  Diagnosis Date    Asymptomatic menopausal state 2022    History of menopause    Personal history of other diseases of the musculoskeletal system and connective tissue 2022    History of osteopenia    Reaction to severe stress, unspecified 2022    Stress   [2]   Past Surgical History:  Procedure Laterality Date    ENDOMETRIAL ABLATION      SALPINGECTOMY     [3] No Known Allergies  [4] No family history on file.     menopause. I provided the name of a  in the area who has group training classes for menopausal women.     HSDD:   We discussed possible treatment options including increasing the use of her testosterone gel to twice a week. Benefits and side effects of the increased dose were discussed in great detail. Benefits include increased drive. Side effects include irritability, mood changes, and oily skin. Patient elects to proceed at this time. We will order another testosterone panel to be completed within 6-12 weeks of starting the increased dose. Patient verbalizes understanding and will follow-up in 6-12 weeks.      Plan:   Order testosterone panel.   Prescription for tretinoin 0.025% gel sent to the patient's pharmacy.    Prescription for testosterone gel sent to the patient's pharmacy.   Prescription for estradiol 0.1 mg patches sent to the patient's pharmacy.     Follow-up in 6-12 weeks.         Pamela Humphrey MD MPH    Scribe Attestation  By signing my name below, I Adriana Patel, Scribe   attest that this documentation has been prepared under the direction and in the presence of Pamela Humphrey MD MPH.          [1]   Past Medical History:  Diagnosis Date    Asymptomatic menopausal state 03/29/2022    History of menopause    Personal history of other diseases of the musculoskeletal system and connective tissue 01/14/2022    History of osteopenia    Reaction to severe stress, unspecified 01/24/2022    Stress   [2]   Past Surgical History:  Procedure Laterality Date    ENDOMETRIAL ABLATION      SALPINGECTOMY     [3] No Known Allergies  [4] No family history on file.

## 2025-06-02 ENCOUNTER — APPOINTMENT (OUTPATIENT)
Dept: UROLOGY | Facility: CLINIC | Age: 55
End: 2025-06-02
Payer: COMMERCIAL

## 2025-06-02 DIAGNOSIS — F52.0 HYPOACTIVE SEXUAL DESIRE DISORDER: ICD-10-CM

## 2025-06-02 DIAGNOSIS — N95.1 MENOPAUSAL SYMPTOMS: Primary | ICD-10-CM

## 2025-06-02 PROCEDURE — 99214 OFFICE O/P EST MOD 30 MIN: CPT | Performed by: OBSTETRICS & GYNECOLOGY

## 2025-06-02 RX ORDER — ESTRADIOL 0.1 MG/D
1 FILM, EXTENDED RELEASE TRANSDERMAL 2 TIMES WEEKLY
Qty: 24 PATCH | Refills: 1 | Status: SHIPPED | OUTPATIENT
Start: 2025-06-02

## 2025-06-02 RX ORDER — TRETINOIN 0.25 MG/G
GEL TOPICAL NIGHTLY
Qty: 45 G | Refills: 3 | Status: SHIPPED | OUTPATIENT
Start: 2025-06-02 | End: 2026-06-02

## 2025-06-05 ENCOUNTER — OFFICE VISIT (OUTPATIENT)
Dept: PRIMARY CARE | Facility: CLINIC | Age: 55
End: 2025-06-05
Payer: COMMERCIAL

## 2025-06-05 VITALS
HEART RATE: 78 BPM | WEIGHT: 147 LBS | DIASTOLIC BLOOD PRESSURE: 80 MMHG | BODY MASS INDEX: 26.04 KG/M2 | OXYGEN SATURATION: 98 % | SYSTOLIC BLOOD PRESSURE: 122 MMHG

## 2025-06-05 DIAGNOSIS — E66.3 OVERWEIGHT (BMI 25.0-29.9): Primary | ICD-10-CM

## 2025-06-05 DIAGNOSIS — F52.0 HYPOACTIVE SEXUAL DESIRE DISORDER: ICD-10-CM

## 2025-06-05 DIAGNOSIS — N95.1 MENOPAUSAL SYMPTOMS: ICD-10-CM

## 2025-06-05 PROCEDURE — 1036F TOBACCO NON-USER: CPT | Performed by: INTERNAL MEDICINE

## 2025-06-05 PROCEDURE — 99214 OFFICE O/P EST MOD 30 MIN: CPT | Performed by: INTERNAL MEDICINE

## 2025-06-05 RX ORDER — VENLAFAXINE HYDROCHLORIDE 75 MG/1
CAPSULE, EXTENDED RELEASE ORAL
COMMUNITY
Start: 2022-03-29 | End: 2025-06-05 | Stop reason: WASHOUT

## 2025-06-05 ASSESSMENT — PAIN SCALES - GENERAL: PAINLEVEL_OUTOF10: 0-NO PAIN

## 2025-06-05 NOTE — PROGRESS NOTES
Subjective   Patient ID: Annmarie Hutchins is a 54 y.o. female who presents for Follow-up.  History of Present Illness  54-year-old female for follow-up visit, last seen February for preventive care visit.  There was concern regarding progressive weight gain at last visit recommended lifestyle modifications and follow-up at next visit.  She was recently seen in emergency room on 5/18 after a finger laceration of the distal tip of her left thumb with a mandolin treated with tourniquet and Dermabond referred to hand surgery subsequently seen by Dr. Altman recommended basic wound care    She has been using a continuous glucose monitor (CGM) for the past seven days to track her blood sugar levels. Her glucose levels have been generally stable, with occasional spikes after consuming certain foods. A spike to 183 mg/dL occurred after a meal of meatballs, pizza, and salad, but levels returned to baseline within a few hours. Another spike to 150 mg/dL was noted after eating a bagel for breakfast.    She is experimenting with different foods to understand their impact on her glucose levels. Consuming flax meal with yogurt did not affect her glucose levels, while a bagel caused a significant increase. Eating ice cream did not cause a noticeable spike in her glucose levels.    She is monitoring her dietary intake closely, noting a preference for dense foods like avocado, which are higher in calories. Her weight remains stable at around 145-147 pounds. She is trying to increase her protein intake to over 100 grams per day and engages in yoga and occasional kettlebell exercises.    She reports generally good sleep, although she woke up at 3 AM on one occasion, which did not significantly affect her glucose levels.      2/25: labs good lpa low     Past medical history  - Depression -improved (prozac -improved, course completed, previously Effexor)   - Perimenopause- h/o endometrial ablation,  LMP 10/20. Night sweats improved with HRT +  progesterone followed by Dr. Humphrey  - Low libido - on testosterone followed by Dr. Humphrey improved  - LBP and SI joint pain -recommended conservative measures.  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   - Father moving to [place] she will be his caretaker   -No tobacco alcohol or drug use     Lifestyle - weight gain (from note in 2/25)  - Diet - same diet   - Exercise - more yoga, not lifting weights   - Sleep - fine, 7-8 hours no interruptiosn.     No food or drink after 7PM, Drink only water at all times., Have a protein-rich breakfast within 30 minutes of waking up., and Thirty minutes of continuous physical activity 7 days a week.       PMHx, FHx, Social Hx, Surg Hx personally reviewed at this appointment. No pertinent findings and/or changes from prior (if applicable).      Objective     /80   Pulse 78   Wt 66.7 kg (147 lb)   SpO2 98%   BMI 26.04 kg/m²    General: Alert and oriented, in no apparent distress   HEENT: No conjunctival erythema, no external facial lesions   Lungs: Breathing comfortably  Skin: No evidence of skin breakdown.  Neuro: AAO x 3, answering questions appropriately, no obvious cranial nerve deficits       Lab Results   Component Value Date    WBC 6.4 02/20/2025    HGB 14.3 02/20/2025    HCT 43.3 02/20/2025     02/20/2025    CHOL 180 02/20/2025    TRIG 96 02/20/2025    HDL 73 02/20/2025    ALT 14 02/20/2025    AST 20 02/20/2025     02/20/2025    K 4.0 02/20/2025     02/20/2025    CREATININE 0.77 02/20/2025    BUN 16 02/20/2025    CO2 27 02/20/2025    TSH 1.08 02/20/2025    HGBA1C 5.4 02/20/2025         Current Outpatient Medications   Medication Instructions    estradiol (Estrace) 0.01 % (0.1 mg/gram) vaginal cream Insert a pea-sized amount into vagina  three times per week at bedtime    estradiol (Estrace) 0.01 % (0.1 mg/gram) vaginal cream Insert a pea-sized amount into vagina three times per week at bedtime    estradiol (Estrace) 0.01 % (0.1 mg/gram) vaginal cream Insert a pea-sized amount into the vagina at bedtime three times per week    estradiol (Vivelle-DOT) 0.1 mg/24 hr patch 1 patch, transdermal, 2 times weekly    progesterone (PROMETRIUM) 100 mg, oral, Nightly    testosterone 20.25 mg/1.25 gram (1.62 %) gel in metered-dose pump Apply one pump once weekly    tretinoin (Retin-A) 0.025 % cream Apply once per week until well tolerated. Then increase to 3 times per week until well tolerated. Then increase to nightly.    tretinoin (Retin-A) 0.025 % gel Topical, Nightly        XR hand left 3+ views  Narrative: Interpreted By:  Sanford Parr,   STUDY:  XR HAND LEFT 3+ VIEWS; ;  5/18/2025 3:43 pm      INDICATION:  Signs/Symptoms:Left thumb laceration.          COMPARISON:  None.      ACCESSION NUMBER(S):  [id number]      ORDERING CLINICIAN:  ALICE LEONG      FINDINGS:  Left hand, three views      There is no fracture. There is no dislocation. There are no  degenerative changes. There is no lytic or sclerotic lesion. There is  no soft tissue abnormality seen.      Impression: No acute abnormality seen.          MACRO:  None      Signed by: Sanford Parr 5/18/2025 4:15 PM  Dictation workstation:   [id number]  BI mammo bilateral screening tomosynthesis  Narrative: Interpreted By:  Seema Rogel,   STUDY:  BI MAMMO BILATERAL SCREENING TOMOSYNTHESIS;  5/15/2025 11:47 am      ACCESSION NUMBER(S):  [id number]      ORDERING CLINICIAN:  ROB PALMER      INDICATION:  Screening. Chronic left nipple inversion.      ,Z12.31 Encounter for screening mammogram for malignant neoplasm of  breast      COMPARISON:  05/10/2024 with additional imaging 05/31/2024, 01/31/2022,  03/09/2017, 05/04/2015      FINDINGS:  2D and tomosynthesis images were reviewed at 1 mm slice  thickness.      Density:  The breasts are extremely dense, which lowers the  sensitivity of mammography.      No suspicious masses or calcifications are identified.      Impression: No mammographic evidence of malignancy.      BI-RADS CATEGORY:  BI-RADS Category:  1 Negative.  Recommendation:  Annual Screening.  Recommended Date:  1 Year.  Laterality:  Bilateral.              For any future breast imaging appointments, please call [phone]).          MACRO:  None      Signed by: Seema Rogel 5/18/2025 2:39 PM  Dictation workstation:   [id number]           Assessment & Plan  Glucose Monitoring  CGM data shows favorable glucose trends with occasional postprandial spikes. Baseline glucose is 100 mg/dL. Dietary adjustments and exercises advised to lower baseline glucose.  - Encourage strengthening exercises.  - Monitor glucose levels and dietary intake.  - Consider continued CGM use if needed.    Weight Management  Weight increased to 147 lbs. Caloric restriction and strengthening exercises recommended. Fit or Me program protocol advised.  - Implement dietary plan with protein intake within 30 minutes of waking and no food or drink after 7 PM.  - Engage in 30 minutes of continuous physical activity daily.    Perimenopause  Satisfaction with current hormone therapy regimen. Well-informed about treatment options.  - Continue current hormone therapy regimen.  - Schedule follow-up with gynecologist as needed.    General Health Maintenance  Mammogram and lab results satisfactory. Advised to continue lifestyle modifications.  - Continue regular health screenings as recommended.  - Maintain lifestyle modifications.    Follow-up  Plans to see her in a year unless condition changes.  - Schedule follow-up appointment in one year.  - Contact if new symptoms or concerns arise.    Health maintenance  Cancer screening:  -Colonoscopy 3/22 repeat 10 years  -Mammogram 5/25  -Pap smear plus HPV 1/22  -Derm - FSE  with   Wm      1 year followup       Eva Edmondson DO       This medical note was created with the assistance of artificial intelligence (AI) for documentation purposes. The content has been reviewed and confirmed by the healthcare provider for accuracy and completeness. Patient consented to the use of audio recording and use of AI during their visit.

## 2025-06-05 NOTE — PATIENT INSTRUCTIONS
Annmarie,   No food or drink after 7PM, Drink only water at all times., Have a protein-rich breakfast within 30 minutes of waking up., and Thirty minutes of continuous physical activity 7 days a week.

## 2025-06-09 DIAGNOSIS — N95.1 MENOPAUSAL SYMPTOMS: ICD-10-CM

## 2025-06-09 RX ORDER — ESTRADIOL 0.1 MG/G
CREAM VAGINAL
Qty: 42.5 G | Refills: 0 | Status: SHIPPED | OUTPATIENT
Start: 2025-06-09

## 2025-06-12 ENCOUNTER — APPOINTMENT (OUTPATIENT)
Dept: UROLOGY | Facility: CLINIC | Age: 55
End: 2025-06-12
Payer: COMMERCIAL

## 2025-06-18 ENCOUNTER — APPOINTMENT (OUTPATIENT)
Dept: PRIMARY CARE | Facility: CLINIC | Age: 55
End: 2025-06-18
Payer: COMMERCIAL

## 2025-07-14 DIAGNOSIS — F52.0 HYPOACTIVE SEXUAL DESIRE DISORDER: ICD-10-CM

## 2025-08-29 LAB — TESTOST SERPL-MCNC: NORMAL NG/DL

## 2025-08-30 DIAGNOSIS — N95.1 MENOPAUSAL SYMPTOMS: ICD-10-CM

## 2025-09-03 RX ORDER — PROGESTERONE 100 MG/1
100 CAPSULE ORAL NIGHTLY
Qty: 90 CAPSULE | Refills: 2 | Status: SHIPPED | OUTPATIENT
Start: 2025-09-03

## 2025-09-08 ENCOUNTER — APPOINTMENT (OUTPATIENT)
Dept: UROLOGY | Facility: CLINIC | Age: 55
End: 2025-09-08
Payer: COMMERCIAL

## 2025-11-04 ENCOUNTER — APPOINTMENT (OUTPATIENT)
Dept: DERMATOLOGY | Facility: CLINIC | Age: 55
End: 2025-11-04
Payer: COMMERCIAL